# Patient Record
Sex: FEMALE | Race: WHITE | NOT HISPANIC OR LATINO | Employment: STUDENT | URBAN - METROPOLITAN AREA
[De-identification: names, ages, dates, MRNs, and addresses within clinical notes are randomized per-mention and may not be internally consistent; named-entity substitution may affect disease eponyms.]

---

## 2019-05-01 PROBLEM — E66.811 OBESITY, CLASS I, BMI 30-34.9: Status: ACTIVE | Noted: 2019-05-01

## 2023-10-26 PROBLEM — E66.811 OBESITY, CLASS I, BMI 30-34.9: Status: RESOLVED | Noted: 2019-05-01 | Resolved: 2023-10-26

## 2023-11-20 ENCOUNTER — APPOINTMENT (OUTPATIENT)
Dept: LAB | Facility: CLINIC | Age: 28
End: 2023-11-20

## 2023-11-20 ENCOUNTER — OCCMED (OUTPATIENT)
Dept: URGENT CARE | Facility: CLINIC | Age: 28
End: 2023-11-20

## 2023-11-20 DIAGNOSIS — Z02.1 PRE-EMPLOYMENT HEALTH SCREENING EXAMINATION: ICD-10-CM

## 2023-11-20 DIAGNOSIS — Z02.1 PRE-EMPLOYMENT HEALTH SCREENING EXAMINATION: Primary | ICD-10-CM

## 2023-11-20 LAB
RUBV IGG SERPL IA-ACNC: 101.5 IU/ML
RUBV IGG SERPL IA-ACNC: 101.5 IU/ML

## 2023-11-20 PROCEDURE — 86735 MUMPS ANTIBODY: CPT

## 2023-11-20 PROCEDURE — 86480 TB TEST CELL IMMUN MEASURE: CPT

## 2023-11-20 PROCEDURE — 86787 VARICELLA-ZOSTER ANTIBODY: CPT

## 2023-11-20 PROCEDURE — 86765 RUBEOLA ANTIBODY: CPT

## 2023-11-20 PROCEDURE — 36415 COLL VENOUS BLD VENIPUNCTURE: CPT

## 2023-11-20 PROCEDURE — 86762 RUBELLA ANTIBODY: CPT

## 2023-11-21 LAB
GAMMA INTERFERON BACKGROUND BLD IA-ACNC: <0 IU/ML
GAMMA INTERFERON BACKGROUND BLD IA-ACNC: <0 IU/ML
M TB IFN-G BLD-IMP: NEGATIVE
M TB IFN-G BLD-IMP: NEGATIVE
M TB IFN-G CD4+ BCKGRND COR BLD-ACNC: 0 IU/ML
MEV IGG SER QL IA: NORMAL
MEV IGG SER QL IA: NORMAL
MITOGEN IGNF BCKGRD COR BLD-ACNC: 10 IU/ML
MITOGEN IGNF BCKGRD COR BLD-ACNC: 10 IU/ML
MUV IGG SER QL IA: NORMAL
MUV IGG SER QL IA: NORMAL
VZV IGG SER QL IA: NORMAL
VZV IGG SER QL IA: NORMAL

## 2024-02-21 PROBLEM — J30.9 ALLERGIC SINUSITIS: Status: RESOLVED | Noted: 2018-08-01 | Resolved: 2024-02-21

## 2024-03-26 DIAGNOSIS — J45.40 MODERATE PERSISTENT ASTHMA WITHOUT COMPLICATION: ICD-10-CM

## 2024-03-26 RX ORDER — LEVALBUTEROL TARTRATE 45 UG/1
AEROSOL, METERED ORAL
Qty: 15 G | Refills: 3 | Status: SHIPPED | OUTPATIENT
Start: 2024-03-26

## 2024-05-05 ENCOUNTER — OFFICE VISIT (OUTPATIENT)
Dept: URGENT CARE | Facility: CLINIC | Age: 29
End: 2024-05-05
Payer: COMMERCIAL

## 2024-05-05 VITALS
WEIGHT: 293 LBS | SYSTOLIC BLOOD PRESSURE: 140 MMHG | RESPIRATION RATE: 18 BRPM | OXYGEN SATURATION: 98 % | HEART RATE: 71 BPM | BODY MASS INDEX: 48.42 KG/M2 | DIASTOLIC BLOOD PRESSURE: 80 MMHG | TEMPERATURE: 96.8 F

## 2024-05-05 DIAGNOSIS — J20.9 ACUTE BRONCHITIS, UNSPECIFIED ORGANISM: Primary | ICD-10-CM

## 2024-05-05 PROCEDURE — 99213 OFFICE O/P EST LOW 20 MIN: CPT | Performed by: FAMILY MEDICINE

## 2024-05-05 RX ORDER — ALBUTEROL SULFATE 90 UG/1
2 AEROSOL, METERED RESPIRATORY (INHALATION) EVERY 4 HOURS PRN
Qty: 18 G | Refills: 0 | Status: SHIPPED | OUTPATIENT
Start: 2024-05-05

## 2024-05-05 RX ORDER — METHYLPREDNISOLONE 4 MG/1
TABLET ORAL
Qty: 21 TABLET | Refills: 0 | Status: SHIPPED | OUTPATIENT
Start: 2024-05-05

## 2024-05-05 RX ORDER — AZITHROMYCIN 250 MG/1
TABLET, FILM COATED ORAL
Qty: 6 TABLET | Refills: 0 | Status: SHIPPED | OUTPATIENT
Start: 2024-05-05 | End: 2024-05-09

## 2024-05-05 RX ORDER — IPRATROPIUM BROMIDE AND ALBUTEROL SULFATE 2.5; .5 MG/3ML; MG/3ML
3 SOLUTION RESPIRATORY (INHALATION) ONCE
Status: COMPLETED | OUTPATIENT
Start: 2024-05-05 | End: 2024-05-05

## 2024-05-05 RX ADMIN — IPRATROPIUM BROMIDE AND ALBUTEROL SULFATE 3 ML: 2.5; .5 SOLUTION RESPIRATORY (INHALATION) at 12:38

## 2024-05-05 NOTE — PROGRESS NOTES
[facility]        NAME: Laureen Mathis is a 29 y.o. female  : 1995    MRN: [id number]  DATE: May 5, 2024  TIME: 12:49 PM    Assessment and Plan   Acute bronchitis, unspecified organism [J20.9]  1. Acute bronchitis, unspecified organism  ipratropium-albuterol (DUO-NEB) 0.5-2.5 mg/3 mL inhalation solution 3 mL    azithromycin (ZITHROMAX) 250 mg tablet    albuterol (Ventolin HFA) 90 mcg/act inhaler    methylPREDNISolone 4 MG tablet therapy pack            Patient Instructions       Follow up with PCP in 3-5 days.  Proceed to  ER if symptoms worsen.    If tests have been performed at [facility], our office will contact you with results if changes need to be made to the care plan discussed with you at the visit.  [facility]    Chief Complaint     Chief Complaint   Patient presents with    Cough     Pt reports x3 weeks.          History of Present Illness       29-year-old female presenting with 3-week history of cough, congestion and chest tightness.  She also reports feeling increasingly short of breath and is having now persistent wheezing.  She has been using her home nebulizer albuterol with no noted relief.  Denies any fevers or chills.    Cough  Associated symptoms include shortness of breath and wheezing.       Review of Systems   Review of Systems   Constitutional: Negative.    HENT: Negative.     Eyes: Negative.    Respiratory:  Positive for cough, shortness of breath and wheezing.    Cardiovascular: Negative.    Gastrointestinal: Negative.    Genitourinary: Negative.    Skin: Negative.    Allergic/Immunologic: Negative.    Neurological: Negative.    Hematological: Negative.    Psychiatric/Behavioral: Negative.           Current Medications       Current Outpatient Medications:     albuterol (Ventolin HFA) 90 mcg/act inhaler, Inhale 2 puffs every 4 (four) hours as needed for wheezing or shortness of breath, Disp: 18 g, Rfl: 0    azithromycin (ZITHROMAX)  250 mg tablet, Take 2 tablets today then 1 tablet daily x 4 days, Disp: 6 tablet, Rfl: 0    budesonide-formoterol (SYMBICORT) 160-4.5 mcg/act inhaler, Inhale 2 puffs 2 (two) times a day Rinse mouth after use., Disp: 10.2 g, Rfl: 3    cetirizine (ZyrTEC) 10 MG chewable tablet, Chew 10 mg daily, Disp: , Rfl:     metFORMIN (GLUCOPHAGE-XR) 500 mg 24 hr tablet, Take 2 tablets (1,000 mg total) by mouth daily with breakfast, Disp: 180 tablet, Rfl: 1    methylPREDNISolone 4 MG tablet therapy pack, Use as directed on package, Disp: 21 tablet, Rfl: 0    montelukast (SINGULAIR) 10 mg tablet, Take 1 tablet (10 mg total) by mouth daily at bedtime, Disp: 30 tablet, Rfl: 5    albuterol (ACCUNEB) 1.25 MG/3ML nebulizer solution, Take 3 mL (1.25 mg total) by nebulization every 4 (four) hours as needed for wheezing, Disp: 360 mL, Rfl: 0    budesonide (PULMICORT) 0.5 mg/2 mL nebulizer solution, TAKE 4 ML (1 MG TOTAL) BY NEBULIZATION 2 (TWO) TIMES A DAY FOR 7 DAYS RINSE MOUTH AFTER USE., Disp: 720 mL, Rfl: 1    fluticasone (FLONASE) 50 mcg/act nasal spray, SPRAY 1 SPRAY INTO EACH NOSTRIL EVERY DAY, Disp: 16 mL, Rfl: 1    levalbuterol (XOPENEX HFA) 45 mcg/act inhaler, INHALE 1-2 PUFFS EVERY 4 HOURS AS NEEDED FOR WHEEZING OR SHORTNESS OF BREATH., Disp: 15 g, Rfl: 3    Naltrexone-buPROPion HCl ER 8-90 MG TB12, 1 tab daily x 7 days, 1 tab twice daily x 7 days, 2 tabs in AM and 1 tab in PM x 7 days, then 2 tabs twice daily (Patient not taking: Reported on 5/5/2024), Disp: 120 tablet, Rfl: 0  No current facility-administered medications for this visit.    Current Allergies     Allergies as of 05/05/2024 - Reviewed 05/05/2024   Allergen Reaction Noted    Dust mite extract Sneezing 07/30/2019    Other  09/15/2016    Shellfish allergy - food allergy Other (See Comments) 09/15/2016            The following portions of the patient's history were reviewed and updated as appropriate: allergies, current medications, past family history, past medical  history, past social history, past surgical history and problem list.     Past Medical History:   Diagnosis Date    Abnormal Pap smear of cervix     Asthma     HPV (human papilloma virus) anogenital infection 2/14/2020    Polycystic ovary syndrome        Past Surgical History:   Procedure Laterality Date    WISDOM TOOTH EXTRACTION         Family History   Problem Relation Age of Onset    Bipolar disorder Mother     Diabetes Mother     Mental illness Mother         bipolar    Hypertension Father     Thyroid disease Father     Substance Abuse Neg Hx         neg fam hx         Medications have been verified.        Objective   /80   Pulse 71   Temp (!) 96.8 °F (36 °C)   Resp 18   Wt 136 kg (300 lb)   LMP  (Approximate) Comment: Pt reports regularly irregular.  SpO2 98%   BMI 48.42 kg/m²   No LMP recorded (approximate).       Physical Exam     Physical Exam  Vitals and nursing note reviewed.   Constitutional:       Appearance: She is well-developed.   HENT:      Head: Normocephalic.      Nose: Nose normal.   Eyes:      Pupils: Pupils are equal, round, and reactive to light.   Cardiovascular:      Rate and Rhythm: Normal rate and regular rhythm.   Pulmonary:      Effort: Pulmonary effort is normal.      Breath sounds: Decreased air movement present. Decreased breath sounds and wheezing present.   Abdominal:      General: Abdomen is flat.   Musculoskeletal:         General: Normal range of motion.      Cervical back: Normal range of motion.   Skin:     General: Skin is warm and dry.   Neurological:      Mental Status: She is alert and oriented to person, place, and time.

## 2024-05-06 ENCOUNTER — OFFICE VISIT (OUTPATIENT)
Dept: BARIATRICS | Facility: CLINIC | Age: 29
End: 2024-05-06
Payer: COMMERCIAL

## 2024-05-06 VITALS
BODY MASS INDEX: 45.99 KG/M2 | HEART RATE: 111 BPM | DIASTOLIC BLOOD PRESSURE: 78 MMHG | WEIGHT: 293 LBS | SYSTOLIC BLOOD PRESSURE: 140 MMHG | HEIGHT: 67 IN

## 2024-05-06 DIAGNOSIS — E66.01 MORBID OBESITY WITH BMI OF 45.0-49.9, ADULT (HCC): ICD-10-CM

## 2024-05-06 PROCEDURE — 99204 OFFICE O/P NEW MOD 45 MIN: CPT | Performed by: INTERNAL MEDICINE

## 2024-05-06 NOTE — PROGRESS NOTES
Assessment/Plan     Laureen Mathis is 29 y.o. year old female  who comes in for consultation for assistance with weight management.     - Discussed options of HealthyCORE-Intensive Lifestyle Intervention Program, Very Low Calorie Diet-VLCD, and Conservative Program and the role of weight loss medications.  - Patient is interested in pursuing Conservative Program    Morbid obesity with BMI of 45.0-49.9, adult (HCC)  Nutrition:  Reviewed diet recall; it is unclear whether patient is getting adequate calories or if she is slowing her metabolism down with too few calories  -Also encouraged patient to incorporate protein with meals and snacks for metabolism and satiety benefit  -Patient encouraged to meet with the dietitian for a nutrition prescription that she can follow    Physical Activity: Introduced [employer] fitness program patient will look into it; recommended following general guidelines below for aerobic activity and strength training as below; 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  Patient reports she does not have a history of high blood pressure but reports she feels nervous as her heart rate is also elevated at 111  -Discussed antiobesity pharmacotherapy at length  -Discussed low availability of some doses of Zepbound which is subject to change  -Discussed Wegovy and patient would like to initiate that.  Patient made aware that supply challenges may arise in the future due to  shortages and Zepbound which may reflect and Wegovy supplies in the future.  Patient understands that cessation of therapy may happen  -Phentermine may not be a good choice along with Qsymia as her blood pressure is elevated in the 140s with tachycardia  -Patient reports that she was prescribed Contrave with her prior insurance which denied it and she never started it  -Patient reports that she was intolerant of metformin which was prescribed for PCOS with abdominal cramps and diarrhea past 2 weeks and she did not want to continue the medication  -    Laureen was seen today for consult.    Diagnoses and all orders for this visit:    Morbid obesity with BMI of 45.0-49.9, adult ([facility])  -     Ambulatory referral to Weight Management  -     Hemoglobin A1C; Future  -     Insulin, fasting; Future  -     Lipid panel; Future  -     T3, free; Future  -     Semaglutide-Weight Management (WEGOVY) 0.25 MG/0.5ML; Inject 0.5 mL (0.25 mg total) under the skin once a week      Wegovy Instructions:    - Begin Wegovy 0.25 mg subcutaneously once a week. Dose changes may occur after 4 doses if medication is tolerated. You will be assessed prior to each dose change to make sure you are tolerating the medication well.  - Please message me when you have 2 pens left from the prescription so there are no lapses in treatment.  - Visit wegovy.com for further information/injection instructions.   -Please eat small frequent, low fat meals to help reduce nausea. Lemon water and saltine crackers may help with this. Avoid fried foods  - Try to stop eating before you feel full  - Side effects of Wegovy discussed: nausea, vomiting, diarrhea, and constipation. If you experience fever, nausea/vomiting, and pain radiating to your back this may be a sign of pancreatitis. Please go to the emergency room if this occurs.  -- Take precautions to avoid dehydration. Aim for 64-80 oz of fluids/day  - A bowel regimen including OTC stool softeners, fiber  supplements to manage constipation is suggested  - Patient understands the side effects of the medication and proper administration. Patient agrees with the treatment plan and all questions were answered.    The most common side effects of Wegovy include nausea, diarrhea, vomiting, constipation, abdominal (stomach) pain, headache, fatigue, dyspepsia (indigestion), dizziness, abdominal distension, eructation (belching), hypoglycemia (low blood sugar) in patients with type 2 diabetes treated with other drugs,  flatulence (gas buildup), gastroenteritis (an intestinal infection) and gastroesophageal reflux disease (a type of digestive disorder).     Wegovy should not be used in patients with a personal or family history of medullary thyroid carcinoma or in patients with a rare condition called Multiple Endocrine Neoplasia syndrome type 2 (MEN 2).     Wegovy also contains warnings for inflammation of the pancreas (pancreatitis), gallbladder problems (including gallstones), low blood sugar, acute kidney injury, diabetic retinopathy (damage to the eye's retina), increased heart rate and suicidal behavior or thinking .  Patient denies personal or ffamily Hx of pancreatitis, MEN 2 tumors and medullary thyroid cancer. Patient understands these major side effects and agrees to start the medication.    Subcutaneous injection:  Inject subQ into the thigh, abdomen, or upper arm; rotate injection sites.  Administer at any time of day, with or without meals.  Administer separately from insulin (do not mix the products); may inject both medications in the same body region but not adjacent to each other.  The day of the weekly administration can be changed as long as the time between the 2 doses is at least 3 days (72 hours)  Administer a missed dose as soon as possible within 4 days (96 hours) of missed dose; if more than 4 days have passed, skip the missed dose and administer next dose on regularly scheduled day and resume regular  "once weekly dosing schedule       -In addition, please follow general recommendations below.          Return visit:  6-8 weeks         General Lifestyle recommendations:    Nutrition   -Avoid skipping meals. Avoid sugary beverages. At least 64oz of water daily.  Limit processed food, refined sugars and grain. Encourage  healthy choices for meals and snacks   -Focus on protein goals and non starchy fiber rich vegetables for satiety effect and to help support a calorie deficit.   - Emphasize portion control, well balanced macronutrient's (protein, carbohydrate, fat using MyPlate method )and adequate protein with each meal/snacks and distributing calories equally throughout the day along with.   -Advise starting the day with a protein breakfast   Behavioral/Stress   Food log via eriberto or provided paper log (eriberto options include www.[employer].com, sparkpeople.com, loseit.com, calorieking.com, [employer]). Encouraged mindful eating. Be sure to set aside time to eat, eat slowly, and savor your food. Consider meditation apps and/or taking a few minutes of mindfulness every AM. Understand the role of regarding the role of stress hormone cortisol in promoting weight gain and visceral fat accumulation. Weigh daily or atleast 2-3 times/ week  Physical Activity   Increase physical activity by 10 minutes daily. Gradually increase physical activity to a goal of 5 days per week for 30 minutes of MODERATE intensity ( should be able to pass the \"talk test\" but should not be able to sing. Target 150-300 minutes  PLUS 2 days per week of FULL BODY resistance training. Progression will be addressed at follow up visits. Encouraged contemplation regarding establishing a daily physical activity routine  - Resistance training along with increase protein intake is important to maintain and enhance metabolism  Sleep   Encourage sleep hygiene and importance of having adequate sleep duration at least > 6 hours to support response in weight loss " efforts    Handouts provided :  THRIVE program at [facility]  MyPlate and food quality  Food log resources, phone eriberto or paper journal  Antiobesity medications options     - Discussed at length and the role of weight loss medications and medication options   - Explained the importance of making lifestyle changes in addition to starting any anti-obesity medications if the  patient chooses.  -  Initial weight loss goal of 5-10% weight loss for improved health  - Weight loss can improve patient's co-morbid conditions and/or prevent weight-related complications.  - Weight is not at goal and patient has been unable to achieve a meaningful weight loss above 5% using various programs and tools for more than 6 months    Laureen was seen today for consult.    Diagnoses and all orders for this visit:    Morbid obesity with BMI of 45.0-49.9, adult ([facility])  -     Ambulatory referral to Weight Management  -     Hemoglobin A1C; Future  -     Insulin, fasting; Future  -     Lipid panel; Future  -     T3, free; Future  -     Semaglutide-Weight Management (WEGOVY) 0.25 MG/0.5ML; Inject 0.5 mL (0.25 mg total) under the skin once a week                      Total time spent reviewing chart, interviewing patient, examining patient, discussing plan, answering all questions, and documentin min, with >50% face-to-face time spent counseling patient on nonsurgical interventions for the treatment of excess weight. Discussed in detail nonsurgical options including intensive lifestyle intervention program, very low-calorie diet program and conservative program.  Discussed the role of weight loss medications.  Counseled patient on diet behavior and exercise modification for weight loss.            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  Sleep -- STOP- BANG-  2/8 8 hours no trouble switching between nights and days    Occupation- night shift   Psycho social-      Start date: 5/6/2024   Current weight : 293 lbs  Current BMI: 46.61 kg/m2  Obesity Class: Above 40- Obesity Class III  Goal weight: 190  lbs    Food behaviorssnacking/grazing-only while at her shift  Gyneac (Menopausal status/periods/contraception)- PCOS , stopped taking OCP last month  Previous Weight loss medications/Weight loss attempts:   Have not been taking metformin for the last 3 months due to GI upset diarrhea and cramps put on it for 5 years  -5 years ago was put on phentermine lost weight 40 lbs but had palpitations        Weight history       Onset--in her 20s since a diagnosis of PCOS  Fam hx of obesity- mom, overweight  Patient reports and other history-  Referred by PCP  Wt Readings from Last 20 Encounters:   05/06/24 133 kg (293 lb 3.2 oz)   05/05/24 136 kg (300 lb)   10/26/23 131 kg (289 lb)   10/13/23 129 kg (285 lb)   10/13/23 129 kg (285 lb)   02/06/23 113 kg (250 lb)   01/27/23 128 kg (283 lb)   11/18/22 127 kg (280 lb)   11/15/22 126 kg (278 lb)   10/08/21 121 kg (266 lb)   05/27/21 118 kg (260 lb 3.2 oz)   11/10/20 112 kg (247 lb)   11/06/20 111 kg (244 lb)   09/02/20 109 kg (240 lb 9.6 oz)   08/24/20 109 kg (239 lb 6.4 oz)   02/12/20 102 kg (225 lb 6.4 oz)   08/07/19 88.6 kg (195 lb 6.4 oz)   07/31/19 87.7 kg (193 lb 6.4 oz)   07/30/19 88.6 kg (195 lb 6.4 oz)   07/25/19 86.2 kg (190 lb)           Medication considerations/contraindications     -Patient denies personal history of pancreatitis. Patient also denies personal and family history of medullary thyroid cancer and multiple endocrine neoplasia type 2 (MEN 2 tumor). -Patient denies any history of  "kidney stones, seizures, or glaucoma, diabetic retinopathy, gall bladder disease, hyperthyroidism.  -Denies Hx of CAD, PAD, palpitations, arrhythmia.   -Denies uncontrolled anxiety or depression, suicidal behavior or thinking , insomnia or sleep disturbance.         Past medical history/past surgical history       Previous notes and records have been reviewed.    The following portions of the patient's history were reviewed and updated as appropriate: allergies, current medications, past family history, past medical history, past social history, past surgical history, and problem list.    Past Medical History:   Diagnosis Date    Abnormal Pap smear of cervix     Asthma     HPV (human papilloma virus) anogenital infection 2/14/2020    Polycystic ovary syndrome          Past Surgical History:   Procedure Laterality Date    WISDOM TOOTH EXTRACTION               Family History   Problem Relation Age of Onset    Bipolar disorder Mother     Diabetes Mother     Mental illness Mother         bipolar    Hypertension Father     Thyroid disease Father     Substance Abuse Neg Hx         neg fam hx            Objective     /78 (BP Location: Left arm, Patient Position: Sitting, Cuff Size: Large)   Pulse (!) 111   Ht 5' 6.5\" (1.689 m)   Wt 133 kg (293 lb 3.2 oz)   BMI 46.61 kg/m²       Review of Systems   Constitutional:  Negative for fatigue.   HENT:  Negative for sore throat.    Respiratory:  Negative for cough and shortness of breath.    Cardiovascular:  Negative for chest pain, palpitations and leg swelling.   Gastrointestinal:  Negative for abdominal pain, constipation, diarrhea and nausea.   Genitourinary:  Negative for dysuria.   Musculoskeletal:  Negative for arthralgias and back pain.   Skin:  Negative for rash.   Neurological:  Negative for headaches.   Psychiatric/Behavioral:  Negative for dysphoric mood. The patient is not nervous/anxious.        Physical Exam  Vitals and nursing note reviewed. "   Constitutional:       Appearance: Normal appearance.   HENT:      Head: Normocephalic.   Pulmonary:      Effort: Pulmonary effort is normal.   Neurological:      General: No focal deficit present.      Mental Status: She is alert and oriented to person, place, and time.   Psychiatric:         Mood and Affect: Mood normal.         Behavior: Behavior normal.         Thought Content: Thought content normal.         Judgment: Judgment normal.            Medications       Current Outpatient Medications:     albuterol (ACCUNEB) 1.25 MG/3ML nebulizer solution, Take 3 mL (1.25 mg total) by nebulization every 4 (four) hours as needed for wheezing, Disp: 360 mL, Rfl: 0    albuterol (Ventolin HFA) 90 mcg/act inhaler, Inhale 2 puffs every 4 (four) hours as needed for wheezing or shortness of breath, Disp: 18 g, Rfl: 0    azithromycin (ZITHROMAX) 250 mg tablet, Take 2 tablets today then 1 tablet daily x 4 days, Disp: 6 tablet, Rfl: 0    budesonide (PULMICORT) 0.5 mg/2 mL nebulizer solution, TAKE 4 ML (1 MG TOTAL) BY NEBULIZATION 2 (TWO) TIMES A DAY FOR 7 DAYS RINSE MOUTH AFTER USE., Disp: 720 mL, Rfl: 1    budesonide-formoterol (SYMBICORT) 160-4.5 mcg/act inhaler, Inhale 2 puffs 2 (two) times a day Rinse mouth after use., Disp: 10.2 g, Rfl: 3    cetirizine (ZyrTEC) 10 MG chewable tablet, Chew 10 mg daily, Disp: , Rfl:     fluticasone (FLONASE) 50 mcg/act nasal spray, SPRAY 1 SPRAY INTO EACH NOSTRIL EVERY DAY, Disp: 16 mL, Rfl: 1    levalbuterol (XOPENEX HFA) 45 mcg/act inhaler, INHALE 1-2 PUFFS EVERY 4 HOURS AS NEEDED FOR WHEEZING OR SHORTNESS OF BREATH., Disp: 15 g, Rfl: 3    metFORMIN (GLUCOPHAGE-XR) 500 mg 24 hr tablet, Take 2 tablets (1,000 mg total) by mouth daily with breakfast, Disp: 180 tablet, Rfl: 1    methylPREDNISolone 4 MG tablet therapy pack, Use as directed on package, Disp: 21 tablet, Rfl: 0    montelukast (SINGULAIR) 10 mg tablet, Take 1 tablet (10 mg total) by mouth daily at bedtime, Disp: 30 tablet, Rfl: 5    " Naltrexone-buPROPion HCl ER 8-90 MG TB12, 1 tab daily x 7 days, 1 tab twice daily x 7 days, 2 tabs in AM and 1 tab in PM x 7 days, then 2 tabs twice daily, Disp: 120 tablet, Rfl: 0    Semaglutide-Weight Management (WEGOVY) 0.25 MG/0.5ML, Inject 0.5 mL (0.25 mg total) under the skin once a week, Disp: 2 mL, Rfl: 1  No current facility-administered medications for this visit.       Only on allergy medicine and inhalers    Labs and imaging     Recent labs and imaging have been personally reviewed.  Lab Results   Component Value Date    WBC 14.17 (H) 10/13/2023    HGB 11.3 (L) 10/13/2023    HCT 37.8 10/13/2023    MCV 73 (L) 10/13/2023     10/13/2023     Lab Results   Component Value Date     09/19/2016    SODIUM 136 10/13/2023    K 4.1 10/13/2023     10/13/2023    CO2 23 10/13/2023    AGAP 9 10/13/2023    BUN 12 10/13/2023    CREATININE 0.75 10/13/2023    GLUC 83 10/13/2023    GLUF 81 04/13/2018    CALCIUM 9.4 10/13/2023    AST 26 10/13/2023    ALT 15 10/13/2023    ALKPHOS 105 (H) 10/13/2023    PROT 7.1 09/19/2016    TP 8.5 (H) 10/13/2023    BILITOT 0.2 09/19/2016    TBILI 0.48 10/13/2023    EGFR 110 10/13/2023     No results found for: \"HGBA1C\"  Lab Results   Component Value Date    NGU2QQLUIIBD 1.740 01/27/2023     Lab Results   Component Value Date    CHOLESTEROL 125 04/13/2018     Lab Results   Component Value Date    HDL 53 04/13/2018     Lab Results   Component Value Date    TRIG 58 04/13/2018     Lab Results   Component Value Date    LDLCALC 60 04/13/2018                      "

## 2024-05-06 NOTE — ASSESSMENT & PLAN NOTE
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  Patient reports she does not have a history of high blood pressure but reports she feels nervous as her heart rate is also elevated at 111  -Discussed antiobesity pharmacotherapy at length  -Discussed low availability of some doses of Zepbound which is subject to change  -Discussed Wegovy and patient would like to initiate that.  Patient made aware that supply challenges may arise in the future due to shortages and Zepbound which may reflect and Wegovy supplies in the future.  Patient understands that cessation of therapy may happen  -Phentermine may not be a good choice along with Qsymia as her blood pressure is elevated in the 140s with tachycardia  -Patient reports that she was prescribed Contrave with her prior insurance which denied it and she never started it  -Patient reports that she was intolerant of metformin  which was prescribed for PCOS with abdominal cramps and diarrhea past 2 weeks and she did not want to continue the medication  -

## 2024-05-07 ENCOUNTER — TELEPHONE (OUTPATIENT)
Dept: BARIATRICS | Facility: CLINIC | Age: 29
End: 2024-05-07

## 2024-05-07 NOTE — TELEPHONE ENCOUNTER
Wegovy Prior Auth request    Key# [id number]    Fax will be scanned and attached to this encounter.

## 2024-05-09 NOTE — TELEPHONE ENCOUNTER
PA for wegovy    Submitted via    [x]CMM-KEY [id number]  []Surescripts-Case ID #    []Faxed to plan   []Other website    []Phone call Case ID #      Office notes sent, clinical questions answered. Awaiting determination    Turnaround time for your insurance to make a decision on your Prior Authorization can take 7-21 business days.

## 2024-05-15 NOTE — TELEPHONE ENCOUNTER
PA for Wegovy approved Approved     Date(s) approved 5/8/2024-5/8/2025    Case #    Patient advised by          [x] [email or web address] Message  [] Phone call   []LMOM  []L/M to call office as no active Communication consent on file  []Unable to leave detailed message as VM not approved on Communication consent       Pharmacy advised by    [x]Fax  []Phone call    Approval letter scanned into Media Yes

## 2024-06-07 DIAGNOSIS — E66.9 OBESITY, CLASS I, BMI 30-34.9: ICD-10-CM

## 2024-06-07 DIAGNOSIS — E66.9 OBESITY, CLASS I, BMI 30-34.9: Primary | ICD-10-CM

## 2024-06-18 ENCOUNTER — TELEPHONE (OUTPATIENT)
Age: 29
End: 2024-06-18

## 2024-06-18 NOTE — TELEPHONE ENCOUNTER
Patient received mychart notification for 6/20/24 appt that was canceled on 6/17/24.  Verified cancellatin of 6/20/24 appt and confirmed 6/25/24 appt.

## 2024-06-24 DIAGNOSIS — J45.40 MODERATE PERSISTENT ASTHMA WITHOUT COMPLICATION: ICD-10-CM

## 2024-06-24 DIAGNOSIS — U07.1 COVID-19: ICD-10-CM

## 2024-06-25 ENCOUNTER — APPOINTMENT (OUTPATIENT)
Dept: LAB | Facility: CLINIC | Age: 29
End: 2024-06-25
Payer: COMMERCIAL

## 2024-06-25 ENCOUNTER — OFFICE VISIT (OUTPATIENT)
Dept: OBGYN CLINIC | Facility: CLINIC | Age: 29
End: 2024-06-25
Payer: COMMERCIAL

## 2024-06-25 ENCOUNTER — TELEPHONE (OUTPATIENT)
Age: 29
End: 2024-06-25

## 2024-06-25 VITALS
DIASTOLIC BLOOD PRESSURE: 66 MMHG | HEART RATE: 83 BPM | SYSTOLIC BLOOD PRESSURE: 116 MMHG | BODY MASS INDEX: 45.83 KG/M2 | OXYGEN SATURATION: 99 % | WEIGHT: 292 LBS | HEIGHT: 67 IN

## 2024-06-25 DIAGNOSIS — N91.2 AMENORRHEA: ICD-10-CM

## 2024-06-25 DIAGNOSIS — N97.0 ANOVULATION: ICD-10-CM

## 2024-06-25 DIAGNOSIS — N97.0 INFERTILITY DUE TO OLIGO-OVULATION: ICD-10-CM

## 2024-06-25 DIAGNOSIS — E28.2 PCOS (POLYCYSTIC OVARIAN SYNDROME): ICD-10-CM

## 2024-06-25 DIAGNOSIS — E28.2 PCOS (POLYCYSTIC OVARIAN SYNDROME): Primary | ICD-10-CM

## 2024-06-25 LAB
B-HCG SERPL-ACNC: <0.6 MIU/ML (ref 0–5)
EST. AVERAGE GLUCOSE BLD GHB EST-MCNC: 114 MG/DL
ESTRADIOL SERPL-MCNC: 56.6 PG/ML
FSH SERPL-ACNC: 4.5 MIU/ML
HBA1C MFR BLD: 5.6 %
LH SERPL-ACNC: 7.5 MIU/ML
PROLACTIN SERPL-MCNC: 11.49 NG/ML (ref 3.34–26.72)
TSH SERPL DL<=0.05 MIU/L-ACNC: 2.4 UIU/ML (ref 0.45–4.5)

## 2024-06-25 PROCEDURE — 84402 ASSAY OF FREE TESTOSTERONE: CPT

## 2024-06-25 PROCEDURE — 84702 CHORIONIC GONADOTROPIN TEST: CPT

## 2024-06-25 PROCEDURE — 82166 ASSAY ANTI-MULLERIAN HORM: CPT

## 2024-06-25 PROCEDURE — 82627 DEHYDROEPIANDROSTERONE: CPT

## 2024-06-25 PROCEDURE — 99214 OFFICE O/P EST MOD 30 MIN: CPT | Performed by: OBSTETRICS & GYNECOLOGY

## 2024-06-25 PROCEDURE — 82670 ASSAY OF TOTAL ESTRADIOL: CPT

## 2024-06-25 PROCEDURE — 84146 ASSAY OF PROLACTIN: CPT

## 2024-06-25 PROCEDURE — 83001 ASSAY OF GONADOTROPIN (FSH): CPT

## 2024-06-25 PROCEDURE — 36415 COLL VENOUS BLD VENIPUNCTURE: CPT

## 2024-06-25 PROCEDURE — 83002 ASSAY OF GONADOTROPIN (LH): CPT

## 2024-06-25 PROCEDURE — 84443 ASSAY THYROID STIM HORMONE: CPT

## 2024-06-25 PROCEDURE — 83036 HEMOGLOBIN GLYCOSYLATED A1C: CPT

## 2024-06-25 PROCEDURE — 84403 ASSAY OF TOTAL TESTOSTERONE: CPT

## 2024-06-25 RX ORDER — MEDROXYPROGESTERONE ACETATE 10 MG/1
10 TABLET ORAL DAILY
Qty: 10 TABLET | Refills: 2 | Status: SHIPPED | OUTPATIENT
Start: 2024-06-25

## 2024-06-25 RX ORDER — BUDESONIDE AND FORMOTEROL FUMARATE DIHYDRATE 160; 4.5 UG/1; UG/1
2 AEROSOL RESPIRATORY (INHALATION) 2 TIMES DAILY
Qty: 10.2 G | Refills: 3 | Status: SHIPPED | OUTPATIENT
Start: 2024-06-25

## 2024-06-25 RX ORDER — LETROZOLE 2.5 MG/1
2.5 TABLET, FILM COATED ORAL DAILY
Qty: 5 TABLET | Refills: 2 | Status: SHIPPED | OUTPATIENT
Start: 2024-06-25

## 2024-06-25 NOTE — PATIENT INSTRUCTIONS
Take provera    LETROZOLE INDUCTION      Ovulation Induction Instructions    Cycle Day 1 (CD1) is the first day of your cycle.    Start taking your prescribed dose of LETROZOLE  on Cycle Day 3 to Cycle Day 7.    Have intercourse every other day after the LETROZOLE is completed.    Go for blood work (Day 21) PROGESTERONE. Our office will call with results.    Call  with positive pregnancy test or if you get your period

## 2024-06-25 NOTE — PROGRESS NOTES
Problem List Items Addressed This Visit          Endocrine    PCOS (polycystic ovarian syndrome) - Primary    Relevant Orders    DHEA-sulfate    Estradiol    Follicle stimulating hormone    TSH, 3rd generation with Free T4 reflex    Prolactin    Luteinizing hormone    Testosterone, free, total    Hemoglobin A1C    Antimullerian hormone (AMH)    US pelvis complete w transvaginal    hCG, quantitative     Other Visit Diagnoses       Amenorrhea        Relevant Orders    US pelvis complete w transvaginal    hCG, quantitative    Infertility due to oligo-ovulation        Relevant Medications    medroxyPROGESTERone (PROVERA) 10 mg tablet    letrozole (FEMARA) 2.5 mg tablet    Other Relevant Orders    Progesterone    Ambulatory Referral to Obstetrics / Gynecology    Anovulation        Relevant Medications    medroxyPROGESTERone (PROVERA) 10 mg tablet    letrozole (FEMARA) 2.5 mg tablet    Other Relevant Orders    Progesterone          Patient was advised labs and ultrasound to repeat evaluation for PCOS.  Discussed with patient she may be referred to [facility] due to diagnosis of PCOS, and ovulation even if she has not had infertility for 1 year.    I had offered patient to start letrozole for ovulation induction which she accepted.  She was started on progesterone for progesterone withdrawal and advised letrozole from days 3-7.  Day 21 progesterone was advised.  Follow-up in 4 months for annual GYN exam.          Subjective      Laureen Mathis is a 29 y.o. female who presents for evaluation of infertility. Patient and partner have been attempting conception for  14  months.  Marital status:  for 1 or 2 years. Pregnancies with current partner: no.    Menstrual and Endocrine History  LMP Patient's last menstrual period was 02/20/2024 (approximate).   Menarche 14   Shortest interval 3 months   Longest interval 4   months   Duration of flow 5 days   Heavy menses no   Clots no   Intermenstrual bleeding no   Postcoital bleeding  no   Dysmenorrhea no   Amenorrhea PCOS, irregular periods since age 19   Weight change no   Hirsutism no   Balding no   Acne no   Galactorrhea no     Obstetrical History  Never pregnant    Gynecologic History  Last PAP 10/8/21   Previous abdominal or pelvic surgery no   Pelvic pain no   Endometriosis no   Hot flashes no   DOMINIC exposure no   Abnormal Pap yes   Cervix Cryo/cone colposcopy   Sexually transmitted diseases no   Pelvic inflammatory disease no     Infertility and Endocrine Studies  Basal body temperature yes   Endo with biopsy no   Hysterosalpingogram no   Post-coital test no   Laparoscopy no   Hormonal studies no   Semen analysis no   Other studies no   Medications none   Other therapies Not applicable   Insemination not applicable     PCOS on US    Sexual History  Frequency 3 times per weeks   Satisfied yes   Dyspareunia no   Use of lubricant no   Douching no   Number of lifetime sex partners 3     Contraception  None    Family History  Thyroid problems  yes   Heart condition or high blood pressure  no   Blood clot or stroke  no   Diabetes  yes   Cancer  no   Birth defects/inherited diseases  no   Infectious diseases (mumps, TB, rubella)  no   Other medical problems  no     Habits  Cigarettes:     Wife -  no      - no  Alcohol:     Wife -   occasional      -  occasional  Marijuana:    Wife - no     - no    The following portions of the patient's history were reviewed and updated as appropriate: allergies, current medications, past family history, past medical history, past social history, past surgical history, and problem list.    Review of Systems  Pertinent items are noted in HPI.   Male History and Exam  Name:  Aj Mathis      Age: 31          Paternity of Pregnancies:  Number with this partner: 1  Number with other partners: 0  Age of youngest child: not applicable    Urologic History:  Infection no   STD no   Mumps no   Varicocele no   Semen analysis no   Undescended testes  "no   Testicular trauma no   Genital surgery no   Ejaculatory problem no   Impotence no                Objective         Female Exam  /66   Pulse 83   Ht 5' 6.5\" (1.689 m)   Wt 132 kg (292 lb)   LMP 02/20/2024 (Approximate)   SpO2 99%   BMI 46.42 kg/m²   Wt Readings from Last 1 Encounters:   06/25/24 132 kg (292 lb)     BMI: Body mass index is 46.42 kg/m².        Physical Exam  Constitutional:       General: She is not in acute distress.     Appearance: She is well-developed.   Cardiovascular:      Rate and Rhythm: Normal rate.      Pulses: Normal pulses.   Neurological:      Mental Status: She is alert and oriented to person, place, and time.   Skin:     General: Skin is warm and dry.   Psychiatric:         Attention and Perception: Attention normal.         Mood and Affect: Mood normal.         Speech: Speech normal.         Behavior: Behavior normal. Behavior is cooperative.            "

## 2024-06-25 NOTE — TELEPHONE ENCOUNTER
Patient called and knows to start the Provera today, but would like to know if she need to take all 10 or can she stop when she gets a period.      Provider advised:   She can stop when she gets her period. If she doesn't she needs to complete the 10 days and wait for her period to come to start the letrozole.    Discussed with patient, no further questions or concerns at this time.

## 2024-06-26 LAB
DHEA-S SERPL-MCNC: 203 UG/DL (ref 84.8–378)
TESTOST FREE SERPL-MCNC: 3.1 PG/ML (ref 0–4.2)
TESTOST SERPL-MCNC: 58 NG/DL (ref 13–71)

## 2024-06-30 LAB — MIS SERPL-MCNC: 21.9 NG/ML

## 2024-07-22 ENCOUNTER — HOSPITAL ENCOUNTER (OUTPATIENT)
Dept: ULTRASOUND IMAGING | Facility: MEDICAL CENTER | Age: 29
Discharge: HOME | End: 2024-07-22
Payer: COMMERCIAL

## 2024-07-22 DIAGNOSIS — E28.2 PCOS (POLYCYSTIC OVARIAN SYNDROME): ICD-10-CM

## 2024-07-22 DIAGNOSIS — N91.2 AMENORRHEA: ICD-10-CM

## 2024-07-22 PROCEDURE — 76856 US EXAM PELVIC COMPLETE: CPT

## 2024-07-22 PROCEDURE — 76830 TRANSVAGINAL US NON-OB: CPT

## 2024-10-12 DIAGNOSIS — E28.2 PCOS (POLYCYSTIC OVARIAN SYNDROME): ICD-10-CM

## 2024-10-15 RX ORDER — METFORMIN HYDROCHLORIDE 500 MG/1
1000 TABLET, EXTENDED RELEASE ORAL
Qty: 180 TABLET | Refills: 0 | Status: SHIPPED | OUTPATIENT
Start: 2024-10-15

## 2024-11-16 DIAGNOSIS — J45.40 MODERATE PERSISTENT ASTHMA WITHOUT COMPLICATION: ICD-10-CM

## 2024-11-19 RX ORDER — LEVALBUTEROL TARTRATE 45 UG/1
2 AEROSOL, METERED ORAL EVERY 8 HOURS PRN
Qty: 15 G | Refills: 5 | Status: SHIPPED | OUTPATIENT
Start: 2024-11-19

## 2024-11-26 ENCOUNTER — PATIENT MESSAGE (OUTPATIENT)
Dept: OBGYN CLINIC | Facility: CLINIC | Age: 29
End: 2024-11-26

## 2024-11-26 NOTE — PATIENT COMMUNICATION
Patient was called and scheduled for surgical consult on 12/19 with Dr. Woods. Patient's yearly was rescheduled as well to 1/7 with Dr. Woods. Informed patient that we did not receive her US results. Informed her that we will contact her tomorrow if we still did not receive them. Can mychart or call.

## 2024-12-11 DIAGNOSIS — U07.1 COVID-19: ICD-10-CM

## 2024-12-11 DIAGNOSIS — J45.40 MODERATE PERSISTENT ASTHMA WITHOUT COMPLICATION: ICD-10-CM

## 2024-12-12 RX ORDER — BUDESONIDE AND FORMOTEROL FUMARATE DIHYDRATE 160; 4.5 UG/1; UG/1
2 AEROSOL RESPIRATORY (INHALATION) 2 TIMES DAILY
Qty: 10.2 G | Refills: 3 | Status: SHIPPED | OUTPATIENT
Start: 2024-12-12

## 2024-12-12 RX ORDER — BUDESONIDE 0.5 MG/2ML
1 INHALANT ORAL 2 TIMES DAILY
Qty: 720 ML | Refills: 1 | Status: SHIPPED | OUTPATIENT
Start: 2024-12-12 | End: 2024-12-19

## 2024-12-19 ENCOUNTER — OFFICE VISIT (OUTPATIENT)
Dept: URGENT CARE | Facility: CLINIC | Age: 29
End: 2024-12-19
Payer: COMMERCIAL

## 2024-12-19 VITALS
HEART RATE: 102 BPM | TEMPERATURE: 100.4 F | RESPIRATION RATE: 18 BRPM | SYSTOLIC BLOOD PRESSURE: 130 MMHG | DIASTOLIC BLOOD PRESSURE: 82 MMHG | OXYGEN SATURATION: 97 %

## 2024-12-19 DIAGNOSIS — J20.9 ACUTE BRONCHITIS, UNSPECIFIED ORGANISM: Primary | ICD-10-CM

## 2024-12-19 PROCEDURE — 99213 OFFICE O/P EST LOW 20 MIN: CPT

## 2024-12-19 RX ORDER — ALBUTEROL SULFATE 90 UG/1
2 INHALANT RESPIRATORY (INHALATION) EVERY 6 HOURS PRN
Qty: 8.5 G | Refills: 0 | Status: SHIPPED | OUTPATIENT
Start: 2024-12-19

## 2024-12-19 RX ORDER — METHYLPREDNISOLONE 4 MG/1
TABLET ORAL
Qty: 1 EACH | Refills: 0 | Status: SHIPPED | OUTPATIENT
Start: 2024-12-19

## 2024-12-19 RX ORDER — AZITHROMYCIN 250 MG/1
TABLET, FILM COATED ORAL
Qty: 6 TABLET | Refills: 0 | Status: SHIPPED | OUTPATIENT
Start: 2024-12-19 | End: 2024-12-23

## 2024-12-19 NOTE — PROGRESS NOTES
[facility]        NAME: Laureen Mathis is a 29 y.o. female  : 1995    MRN: [id number]  DATE: 2024  TIME: 1:53 PM    Assessment and Plan   Acute bronchitis, unspecified organism [J20.9]  1. Acute bronchitis, unspecified organism  azithromycin (ZITHROMAX) 250 mg tablet    methylPREDNISolone 4 MG tablet therapy pack    albuterol (ProAir HFA) 90 mcg/act inhaler            Patient Instructions       Follow up with PCP in 3-5 days.  Proceed to  ER if symptoms worsen.    If tests have been performed at [facility], our office will contact you with results if changes need to be made to the care plan discussed with you at the visit.  You can review your full results on [facility].    Chief Complaint     Chief Complaint   Patient presents with    Cough     Started a week ago with congested cough, now reports loss of voice, body aches and chills, unsure of fever         History of Present Illness       29-year-old female presents for worsening cold-like symptoms x 7 days.  Patient was at onset she had cough, congestion, chest tightness.  Symptoms improved.  4 days ago symptoms worsened with sore throat, fevers, chills.  Last use Tylenol at 9 AM due to subjective fevers and chills.  Denies known sick contacts.    Cough  Associated symptoms include chills, a fever, rhinorrhea, a sore throat and wheezing. Pertinent negatives include no ear pain.       Review of Systems   Review of Systems   Constitutional:  Positive for chills and fever.   HENT:  Positive for congestion, rhinorrhea and sore throat. Negative for ear pain.    Respiratory:  Positive for cough, chest tightness and wheezing.          Current Medications       Current Outpatient Medications:     albuterol (ProAir HFA) 90 mcg/act inhaler, Inhale 2 puffs every 6 (six) hours as needed for wheezing or shortness of breath (cough), Disp: 8.5 g, Rfl: 0    azithromycin (ZITHROMAX) 250 mg tablet, Take 2 tablets today then 1 tablet daily x 4  days, Disp: 6 tablet, Rfl: 0    methylPREDNISolone 4 MG tablet therapy pack, Use as directed on package, Disp: 1 each, Rfl: 0    albuterol (ACCUNEB) 1.25 MG/3ML nebulizer solution, Take 3 mL (1.25 mg total) by nebulization every 4 (four) hours as needed for wheezing, Disp: 360 mL, Rfl: 0    albuterol (Ventolin HFA) 90 mcg/act inhaler, Inhale 2 puffs every 4 (four) hours as needed for wheezing or shortness of breath, Disp: 18 g, Rfl: 0    budesonide (PULMICORT) 0.5 mg/2 mL nebulizer solution, Take 4 mL (1 mg total) by nebulization 2 (two) times a day for 7 days Rinse mouth after use., Disp: 720 mL, Rfl: 1    budesonide-formoterol (SYMBICORT) 160-4.5 mcg/act inhaler, Inhale 2 puffs 2 (two) times a day Rinse mouth after use., Disp: 10.2 g, Rfl: 3    cetirizine (ZyrTEC) 10 MG chewable tablet, Chew 10 mg daily, Disp: , Rfl:     fluticasone (FLONASE) 50 mcg/act nasal spray, SPRAY 1 SPRAY INTO EACH NOSTRIL EVERY DAY, Disp: 16 mL, Rfl: 1    letrozole (FEMARA) 2.5 mg tablet, Take 1 tablet (2.5 mg total) by mouth daily, Disp: 5 tablet, Rfl: 2    levalbuterol (XOPENEX HFA) 45 mcg/act inhaler, Inhale 2 puffs every 8 (eight) hours as needed for wheezing, Disp: 15 g, Rfl: 5    medroxyPROGESTERone (PROVERA) 10 mg tablet, Take 1 tablet (10 mg total) by mouth daily, Disp: 10 tablet, Rfl: 2    metFORMIN (GLUCOPHAGE-XR) 500 mg 24 hr tablet, Take 2 tablets (1,000 mg total) by mouth daily with breakfast, Disp: 180 tablet, Rfl: 0    methylPREDNISolone 4 MG tablet therapy pack, Use as directed on package, Disp: 21 tablet, Rfl: 0    montelukast (SINGULAIR) 10 mg tablet, Take 1 tablet (10 mg total) by mouth daily at bedtime, Disp: 30 tablet, Rfl: 5    Naltrexone-buPROPion HCl ER 8-90 MG TB12, 1 tab daily x 7 days, 1 tab twice daily x 7 days, 2 tabs in AM and 1 tab in PM x 7 days, then 2 tabs twice daily, Disp: 120 tablet, Rfl: 0    Semaglutide-Weight Management (WEGOVY) 0.5 MG/0.5ML, Inject 0.5 mL (0.5 mg total) under the skin once a week,  Disp: 2 mL, Rfl: 0    Current Allergies     Allergies as of 12/19/2024 - Reviewed 12/19/2024   Allergen Reaction Noted    Dust mite extract Sneezing 07/30/2019    Other  09/15/2016    Shellfish allergy - food allergy Other (See Comments) 09/15/2016            The following portions of the patient's history were reviewed and updated as appropriate: allergies, current medications, past family history, past medical history, past social history, past surgical history and problem list.     Past Medical History:   Diagnosis Date    Abnormal Pap smear of cervix     Asthma     HPV (human papilloma virus) anogenital infection 2/14/2020    Polycystic ovary syndrome        Past Surgical History:   Procedure Laterality Date    WISDOM TOOTH EXTRACTION         Family History   Problem Relation Age of Onset    Bipolar disorder Mother     Diabetes Mother     Mental illness Mother         bipolar    Hypertension Father     Thyroid disease Father     Substance Abuse Neg Hx         neg fam hx         Medications have been verified.        Objective   /82 (BP Location: Right arm, Patient Position: Sitting)   Pulse 102   Temp 100.4 °F (38 °C) (Tympanic)   Resp 18   LMP  (Within Months)   SpO2 97%   No LMP recorded (within months).       Physical Exam     Physical Exam  Vitals and nursing note reviewed.   Constitutional:       General: She is not in acute distress.     Appearance: She is not toxic-appearing.   HENT:      Head: Normocephalic and atraumatic.      Right Ear: Tympanic membrane, ear canal and external ear normal.      Left Ear: Tympanic membrane, ear canal and external ear normal.      Nose: Rhinorrhea present.      Mouth/Throat:      Mouth: Mucous membranes are moist.      Pharynx: No oropharyngeal exudate or posterior oropharyngeal erythema.   Eyes:      Conjunctiva/sclera: Conjunctivae normal.   Cardiovascular:      Rate and Rhythm: Regular rhythm. Tachycardia present.   Pulmonary:      Effort: Pulmonary effort  is normal.      Breath sounds: Wheezing present.   Lymphadenopathy:      Cervical: No cervical adenopathy.   Neurological:      Mental Status: She is alert.   Psychiatric:         Mood and Affect: Mood normal.         Behavior: Behavior normal.

## 2024-12-23 ENCOUNTER — TELEPHONE (OUTPATIENT)
Age: 29
End: 2024-12-23

## 2024-12-23 NOTE — TELEPHONE ENCOUNTER
Left voice message for patient in regards to her question about the appointment. Advised patient that she would need to keep both appointment. Advised patient to call the office if she had any further questions.

## 2024-12-23 NOTE — TELEPHONE ENCOUNTER
Pt carla surg consult for 1/23/25, but has yearly scheduled for 1/7/25. Questioning if she needs both visits. Offered to send message to office to discuss what is needed and if consult can be scheduled earlier.

## 2025-01-13 ENCOUNTER — TELEPHONE (OUTPATIENT)
Age: 30
End: 2025-01-13

## 2025-01-13 NOTE — PROGRESS NOTES
Assessment/Plan:  Problem List Items Addressed This Visit    None  Visit Diagnoses         BMI 45.0-49.9, adult (HCC)    -  Primary    Relevant Orders    Ambulatory Referral to Weight Management      Endometrial polyp          Abnormal uterine bleeding (AUB)               Endometrial polyp    Endometrial polyps are one of the most common etiologies of abnormal bleeding in both premenopausal and postmenopausal women. Discussed that most cases of polyps are benign.  Most cases of endometrial polyps present as abnormal uterine bleeding, incidental finding on ultrasound, hysteroscopy, endometrial cells on Pap, or prolapsed polyp on examination.    Symptomatic polyps should be removed, regardless of menopausal status.    Polypectomy is also recommended if the polyp is larger greater than 1.5 cm, woman is postmenopausal, if there are multiple polyps, if the polyp is large enough that it is prolapsing or if there is infertility.    Some smaller polyps can regress was multiple polyps in larger polyps are less likely to request and most likely cause symptoms.    Polypectomy under hysteroscopic guidance is the preferred treatment of choice for most endometrial polyps.  Polypectomy in general results and improvement of symptoms.    Complications of hysteroscopy and polypectomy include fluid overload, uterine perforation, intraoperative hemorrhage, endometritis, bladder or bowel injury.      Patient was referred to bariatric surgery/weight management for discussion regarding surgical weight loss.  She is interested in hearing about this more  Discussed with patient increased risk with pregnancy with elevated BMI and better outcomes with normal BMI as well as increased fertility rates and ovulatory rates when weight loss is achieved.      Subjective   Patient ID: Laureen Mathis is a 29 y.o. female.    Patient is here for a problem visit.    Chief Complaint   Patient presents with    Consult     Surgical discussion       Patient  with multiple endometrial polyps based off ultrasound done at [place].  Patient is also suffering from infertility.  Patient was told she needs polyps to be removed prior to intrauterine insemination but due to BMI over 40 cannot be performed by their facility.    Menstrual History:  OB History          0    Para   0    Term   0       0    AB   0    Living   0         SAB   0    IAB   0    Ectopic   0    Multiple   0    Live Births   0                  No LMP recorded.         Past Medical History:   Diagnosis Date    Abnormal Pap smear of cervix     Asthma     HPV (human papilloma virus) anogenital infection 2020    Polycystic ovary syndrome        Past Surgical History:   Procedure Laterality Date    WISDOM TOOTH EXTRACTION         Social History     Tobacco Use    Smoking status: Never    Smokeless tobacco: Never   Vaping Use    Vaping status: Never Used   Substance Use Topics    Alcohol use: Yes     Comment: occassionally    Drug use: No        Allergies   Allergen Reactions    Dust Mite Extract Sneezing     Other reaction(s): Sneezing    Other      Dust and trees and pollens    Shellfish Allergy - Food Allergy Other (See Comments)     N/V,   itching         Current Outpatient Medications:     albuterol (ACCUNEB) 1.25 MG/3ML nebulizer solution, Take 3 mL (1.25 mg total) by nebulization every 4 (four) hours as needed for wheezing, Disp: 360 mL, Rfl: 0    albuterol (ProAir HFA) 90 mcg/act inhaler, Inhale 2 puffs every 6 (six) hours as needed for wheezing or shortness of breath (cough), Disp: 8.5 g, Rfl: 0    albuterol (Ventolin HFA) 90 mcg/act inhaler, Inhale 2 puffs every 4 (four) hours as needed for wheezing or shortness of breath, Disp: 18 g, Rfl: 0    budesonide-formoterol (SYMBICORT) 160-4.5 mcg/act inhaler, Inhale 2 puffs 2 (two) times a day Rinse mouth after use., Disp: 10.2 g, Rfl: 3    cetirizine (ZyrTEC) 10 MG chewable tablet, Chew 10 mg daily, Disp: , Rfl:     fluticasone  (FLONASE) 50 mcg/act nasal spray, SPRAY 1 SPRAY INTO EACH NOSTRIL EVERY DAY, Disp: 16 mL, Rfl: 1    metFORMIN (GLUCOPHAGE-XR) 500 mg 24 hr tablet, Take 2 tablets (1,000 mg total) by mouth daily with breakfast, Disp: 180 tablet, Rfl: 0    budesonide (PULMICORT) 0.5 mg/2 mL nebulizer solution, Take 4 mL (1 mg total) by nebulization 2 (two) times a day for 7 days Rinse mouth after use., Disp: 720 mL, Rfl: 1    letrozole (FEMARA) 2.5 mg tablet, Take 1 tablet (2.5 mg total) by mouth daily (Patient not taking: Reported on 1/15/2025), Disp: 5 tablet, Rfl: 2    levalbuterol (XOPENEX HFA) 45 mcg/act inhaler, Inhale 2 puffs every 8 (eight) hours as needed for wheezing (Patient not taking: Reported on 1/15/2025), Disp: 15 g, Rfl: 5    medroxyPROGESTERone (PROVERA) 10 mg tablet, Take 1 tablet (10 mg total) by mouth daily (Patient not taking: Reported on 1/15/2025), Disp: 10 tablet, Rfl: 2    methylPREDNISolone 4 MG tablet therapy pack, Use as directed on package (Patient not taking: Reported on 1/15/2025), Disp: 21 tablet, Rfl: 0    methylPREDNISolone 4 MG tablet therapy pack, Use as directed on package (Patient not taking: Reported on 1/15/2025), Disp: 1 each, Rfl: 0    montelukast (SINGULAIR) 10 mg tablet, Take 1 tablet (10 mg total) by mouth daily at bedtime (Patient not taking: Reported on 1/15/2025), Disp: 30 tablet, Rfl: 5    Naltrexone-buPROPion HCl ER 8-90 MG TB12, 1 tab daily x 7 days, 1 tab twice daily x 7 days, 2 tabs in AM and 1 tab in PM x 7 days, then 2 tabs twice daily (Patient not taking: Reported on 1/15/2025), Disp: 120 tablet, Rfl: 0    Semaglutide-Weight Management (WEGOVY) 0.5 MG/0.5ML, Inject 0.5 mL (0.5 mg total) under the skin once a week (Patient not taking: Reported on 1/15/2025), Disp: 2 mL, Rfl: 0      Pertinent laboratory testing, imaging studies and prior external records reviewed    Review of Systems   Constitutional: Negative.    HENT: Negative.     Eyes: Negative.    Respiratory: Negative.    "  Cardiovascular: Negative.    Gastrointestinal: Negative.    Endocrine: Negative.    Genitourinary:         As noted in HPI   Musculoskeletal: Negative.    Skin: Negative.    Allergic/Immunologic: Negative.    Neurological: Negative.    Hematological: Negative.    Psychiatric/Behavioral: Negative.           /84 (BP Location: Right arm, Patient Position: Sitting, Cuff Size: Adult)   Ht 5' 6.5\" (1.689 m)   Wt (!) 137 kg (302 lb)   BMI 48.01 kg/m²       Physical Exam  Constitutional:       Appearance: She is well-developed.   Genitourinary:      No vaginal discharge or bleeding.        Right Adnexa: not tender and no mass present.     Left Adnexa: not tender and no mass present.     No cervical motion tenderness.      Uterus is not enlarged or tender.   Neck:      Thyroid: No thyromegaly.   Cardiovascular:      Rate and Rhythm: Normal rate and regular rhythm.      Heart sounds: Normal heart sounds.   Pulmonary:      Effort: Pulmonary effort is normal.      Breath sounds: Normal breath sounds.   Abdominal:      General: There is no distension.      Palpations: Abdomen is soft.      Tenderness: There is no abdominal tenderness.   Musculoskeletal:         General: No tenderness.   Neurological:      Mental Status: She is alert and oriented to person, place, and time.   Skin:     General: Skin is warm and dry.         Study Result    Narrative & Impression   PELVIC ULTRASOUND, COMPLETE     INDICATION: The patient is 29 years old. E28.2: Polycystic ovarian syndrome  N91.2: Amenorrhea, unspecified.     COMPARISON: Compared with 4/13/2018     TECHNIQUE: Transabdominal pelvic ultrasound was performed in sagittal and transverse planes with a curvilinear transducer. Additional transvaginal imaging was performed to better evaluate the endometrium and ovaries. Imaging included volumetric sweeps as   well as traditional still imaging technique.     FINDINGS:     UTERUS:  The uterus is anteverted in position, measuring 7.2 " x 3.1 x 5.6 cm.  The uterus has a normal contour and echotexture.  The cervix appears within normal limits.     ENDOMETRIUM:  The endometrial echo complex has an AP caliber of 2.0 mm.  Appearance within normal limits.     OVARIES/ADNEXA:  Right ovary: 4.5 x 3.4 x 3.1 cm. 25.0 mL.  Ovarian Doppler flow is within normal limits.  Multiple peripheral follicles. Large anechoic cyst measuring 4.1 x 4.2 x 3.8 cm.     Left ovary: 3.1 x 3.3 x 3.8 cm. 20.4 mL.  Ovarian Doppler flow is within normal limits.  Multiple peripheral follicles. Large anechoic cyst measuring 3.6 x 3.3 x 3.0 cm compared to a previous focus measuring 1.9 x 1.9 x 1.8 cm.     OTHER:  No free fluid or loculated fluid collections.     IMPRESSION:     Prominent ovaries with bilateral multiple follicles. Large anechoic cyst seen.  Findings consistent with the patient's known history of PCOS.     Bilateral anechoic simple appearing cysts largest on the right measuring 4.1 x 4.2 x 3.8 cm and on the left measuring 3.6 x 3.3 x 3.0 cm O-RADS 2 = Almost certainly benign.  No further followup or workup needed.                    Workstation performed: [id number]            Future Appointments   Date Time Provider Department Center   2/7/2025  9:30 AM Bam Redmond MD [facility]   2/11/2025  9:00 AM Trina Woods MD Complete  [facility]

## 2025-01-13 NOTE — TELEPHONE ENCOUNTER
Patient has an appointment on 1/15 for a consult. Patient called to make sure that Andrew Flores sent over the results from her US. We did received them but there was no images. Patient wants to know if that will be enough or do we need the images? Please follow up with the patient if we need those images before appointment.

## 2025-01-15 ENCOUNTER — TELEPHONE (OUTPATIENT)
Age: 30
End: 2025-01-15

## 2025-01-15 ENCOUNTER — OFFICE VISIT (OUTPATIENT)
Dept: OBGYN CLINIC | Facility: CLINIC | Age: 30
End: 2025-01-15
Payer: COMMERCIAL

## 2025-01-15 VITALS
DIASTOLIC BLOOD PRESSURE: 84 MMHG | BODY MASS INDEX: 45.99 KG/M2 | WEIGHT: 293 LBS | HEIGHT: 67 IN | SYSTOLIC BLOOD PRESSURE: 136 MMHG

## 2025-01-15 DIAGNOSIS — N93.9 ABNORMAL UTERINE BLEEDING (AUB): ICD-10-CM

## 2025-01-15 DIAGNOSIS — N84.0 ENDOMETRIAL POLYP: Primary | ICD-10-CM

## 2025-01-15 PROCEDURE — 99214 OFFICE O/P EST MOD 30 MIN: CPT | Performed by: OBSTETRICS & GYNECOLOGY

## 2025-01-15 RX ORDER — SODIUM CHLORIDE, SODIUM LACTATE, POTASSIUM CHLORIDE, CALCIUM CHLORIDE 600; 310; 30; 20 MG/100ML; MG/100ML; MG/100ML; MG/100ML
125 INJECTION, SOLUTION INTRAVENOUS CONTINUOUS
OUTPATIENT
Start: 2025-01-15

## 2025-01-15 NOTE — TELEPHONE ENCOUNTER
Pt would like to schedule a surgical consult so I transferred her to [place] in the office to help her

## 2025-01-15 NOTE — H&P
Assessment/Plan:  Problem List Items Addressed This Visit    None  Visit Diagnoses         BMI 45.0-49.9, adult (HCC)    -  Primary    Relevant Orders    Ambulatory Referral to Weight Management      Endometrial polyp          Abnormal uterine bleeding (AUB)               Endometrial polyp    Endometrial polyps are one of the most common etiologies of abnormal bleeding in both premenopausal and postmenopausal women. Discussed that most cases of polyps are benign.  Most cases of endometrial polyps present as abnormal uterine bleeding, incidental finding on ultrasound, hysteroscopy, endometrial cells on Pap, or prolapsed polyp on examination.    Symptomatic polyps should be removed, regardless of menopausal status.    Polypectomy is also recommended if the polyp is larger greater than 1.5 cm, woman is postmenopausal, if there are multiple polyps, if the polyp is large enough that it is prolapsing or if there is infertility.    Some smaller polyps can regress was multiple polyps in larger polyps are less likely to request and most likely cause symptoms.    Polypectomy under hysteroscopic guidance is the preferred treatment of choice for most endometrial polyps.  Polypectomy in general results and improvement of symptoms.    Complications of hysteroscopy and polypectomy include fluid overload, uterine perforation, intraoperative hemorrhage, endometritis, bladder or bowel injury.      Patient was referred to bariatric surgery/weight management for discussion regarding surgical weight loss.  She is interested in hearing about this more  Discussed with patient increased risk with pregnancy with elevated BMI and better outcomes with normal BMI as well as increased fertility rates and ovulatory rates when weight loss is achieved.      Subjective   Patient ID: Laureen Mathis is a 29 y.o. female.    Patient is here for a problem visit.    Chief Complaint   Patient presents with    Consult     Surgical discussion       Patient  with multiple endometrial polyps based off ultrasound done at [place].  Patient is also suffering from infertility.  Patient was told she needs polyps to be removed prior to intrauterine insemination but due to BMI over 40 cannot be performed by their facility.    Menstrual History:  OB History          0    Para   0    Term   0       0    AB   0    Living   0         SAB   0    IAB   0    Ectopic   0    Multiple   0    Live Births   0                  No LMP recorded.         Past Medical History:   Diagnosis Date    Abnormal Pap smear of cervix     Asthma     HPV (human papilloma virus) anogenital infection 2020    Polycystic ovary syndrome        Past Surgical History:   Procedure Laterality Date    WISDOM TOOTH EXTRACTION         Social History     Tobacco Use    Smoking status: Never    Smokeless tobacco: Never   Vaping Use    Vaping status: Never Used   Substance Use Topics    Alcohol use: Yes     Comment: occassionally    Drug use: No        Allergies   Allergen Reactions    Dust Mite Extract Sneezing     Other reaction(s): Sneezing    Other      Dust and trees and pollens    Shellfish Allergy - Food Allergy Other (See Comments)     N/V,   itching         Current Outpatient Medications:     albuterol (ACCUNEB) 1.25 MG/3ML nebulizer solution, Take 3 mL (1.25 mg total) by nebulization every 4 (four) hours as needed for wheezing, Disp: 360 mL, Rfl: 0    albuterol (ProAir HFA) 90 mcg/act inhaler, Inhale 2 puffs every 6 (six) hours as needed for wheezing or shortness of breath (cough), Disp: 8.5 g, Rfl: 0    albuterol (Ventolin HFA) 90 mcg/act inhaler, Inhale 2 puffs every 4 (four) hours as needed for wheezing or shortness of breath, Disp: 18 g, Rfl: 0    budesonide-formoterol (SYMBICORT) 160-4.5 mcg/act inhaler, Inhale 2 puffs 2 (two) times a day Rinse mouth after use., Disp: 10.2 g, Rfl: 3    cetirizine (ZyrTEC) 10 MG chewable tablet, Chew 10 mg daily, Disp: , Rfl:     fluticasone  (FLONASE) 50 mcg/act nasal spray, SPRAY 1 SPRAY INTO EACH NOSTRIL EVERY DAY, Disp: 16 mL, Rfl: 1    metFORMIN (GLUCOPHAGE-XR) 500 mg 24 hr tablet, Take 2 tablets (1,000 mg total) by mouth daily with breakfast, Disp: 180 tablet, Rfl: 0    budesonide (PULMICORT) 0.5 mg/2 mL nebulizer solution, Take 4 mL (1 mg total) by nebulization 2 (two) times a day for 7 days Rinse mouth after use., Disp: 720 mL, Rfl: 1    letrozole (FEMARA) 2.5 mg tablet, Take 1 tablet (2.5 mg total) by mouth daily (Patient not taking: Reported on 1/15/2025), Disp: 5 tablet, Rfl: 2    levalbuterol (XOPENEX HFA) 45 mcg/act inhaler, Inhale 2 puffs every 8 (eight) hours as needed for wheezing (Patient not taking: Reported on 1/15/2025), Disp: 15 g, Rfl: 5    medroxyPROGESTERone (PROVERA) 10 mg tablet, Take 1 tablet (10 mg total) by mouth daily (Patient not taking: Reported on 1/15/2025), Disp: 10 tablet, Rfl: 2    methylPREDNISolone 4 MG tablet therapy pack, Use as directed on package (Patient not taking: Reported on 1/15/2025), Disp: 21 tablet, Rfl: 0    methylPREDNISolone 4 MG tablet therapy pack, Use as directed on package (Patient not taking: Reported on 1/15/2025), Disp: 1 each, Rfl: 0    montelukast (SINGULAIR) 10 mg tablet, Take 1 tablet (10 mg total) by mouth daily at bedtime (Patient not taking: Reported on 1/15/2025), Disp: 30 tablet, Rfl: 5    Naltrexone-buPROPion HCl ER 8-90 MG TB12, 1 tab daily x 7 days, 1 tab twice daily x 7 days, 2 tabs in AM and 1 tab in PM x 7 days, then 2 tabs twice daily (Patient not taking: Reported on 1/15/2025), Disp: 120 tablet, Rfl: 0    Semaglutide-Weight Management (WEGOVY) 0.5 MG/0.5ML, Inject 0.5 mL (0.5 mg total) under the skin once a week (Patient not taking: Reported on 1/15/2025), Disp: 2 mL, Rfl: 0      Pertinent laboratory testing, imaging studies and prior external records reviewed    Review of Systems   Constitutional: Negative.    HENT: Negative.     Eyes: Negative.    Respiratory: Negative.    "  Cardiovascular: Negative.    Gastrointestinal: Negative.    Endocrine: Negative.    Genitourinary:         As noted in HPI   Musculoskeletal: Negative.    Skin: Negative.    Allergic/Immunologic: Negative.    Neurological: Negative.    Hematological: Negative.    Psychiatric/Behavioral: Negative.           /84 (BP Location: Right arm, Patient Position: Sitting, Cuff Size: Adult)   Ht 5' 6.5\" (1.689 m)   Wt (!) 137 kg (302 lb)   BMI 48.01 kg/m²       Physical Exam  Constitutional:       Appearance: She is well-developed.   Genitourinary:      No vaginal discharge or bleeding.        Right Adnexa: not tender and no mass present.     Left Adnexa: not tender and no mass present.     No cervical motion tenderness.      Uterus is not enlarged or tender.   Neck:      Thyroid: No thyromegaly.   Cardiovascular:      Rate and Rhythm: Normal rate and regular rhythm.      Heart sounds: Normal heart sounds.   Pulmonary:      Effort: Pulmonary effort is normal.      Breath sounds: Normal breath sounds.   Abdominal:      General: There is no distension.      Palpations: Abdomen is soft.      Tenderness: There is no abdominal tenderness.   Musculoskeletal:         General: No tenderness.   Neurological:      Mental Status: She is alert and oriented to person, place, and time.   Skin:     General: Skin is warm and dry.         Study Result    Narrative & Impression   PELVIC ULTRASOUND, COMPLETE     INDICATION: The patient is 29 years old. E28.2: Polycystic ovarian syndrome  N91.2: Amenorrhea, unspecified.     COMPARISON: Compared with 4/13/2018     TECHNIQUE: Transabdominal pelvic ultrasound was performed in sagittal and transverse planes with a curvilinear transducer. Additional transvaginal imaging was performed to better evaluate the endometrium and ovaries. Imaging included volumetric sweeps as   well as traditional still imaging technique.     FINDINGS:     UTERUS:  The uterus is anteverted in position, measuring 7.2 " x 3.1 x 5.6 cm.  The uterus has a normal contour and echotexture.  The cervix appears within normal limits.     ENDOMETRIUM:  The endometrial echo complex has an AP caliber of 2.0 mm.  Appearance within normal limits.     OVARIES/ADNEXA:  Right ovary: 4.5 x 3.4 x 3.1 cm. 25.0 mL.  Ovarian Doppler flow is within normal limits.  Multiple peripheral follicles. Large anechoic cyst measuring 4.1 x 4.2 x 3.8 cm.     Left ovary: 3.1 x 3.3 x 3.8 cm. 20.4 mL.  Ovarian Doppler flow is within normal limits.  Multiple peripheral follicles. Large anechoic cyst measuring 3.6 x 3.3 x 3.0 cm compared to a previous focus measuring 1.9 x 1.9 x 1.8 cm.     OTHER:  No free fluid or loculated fluid collections.     IMPRESSION:     Prominent ovaries with bilateral multiple follicles. Large anechoic cyst seen.  Findings consistent with the patient's known history of PCOS.     Bilateral anechoic simple appearing cysts largest on the right measuring 4.1 x 4.2 x 3.8 cm and on the left measuring 3.6 x 3.3 x 3.0 cm O-RADS 2 = Almost certainly benign.  No further followup or workup needed.                    Workstation performed: [id number]            Future Appointments   Date Time Provider Department Center   2025  9:30 AM Bam Redmond MD [facility]   2025  9:00 AM Trina Woods MD [facility] OB GYN Department  Surgery Scheduling Sheet    Patient Name: Laureen Mathis  : 1995    Provider: Trina Woods MD     Needed: no; Language: N/A    Procedure: exam under anesthesia, dilation and curettage , hysteroscopy, and polypectomy    Diagnosis: AUB, endometrial polyp    Special Needs or Equipment: Symphion    Anesthesia: General anesthesia    Length of stay: outpatient  Does patient have comorbid conditions that will require close perioperative monitoring prior to safe discharge: no    The patient has comorbid conditions that will require close perioperative monitoring  prior to safe discharge, including N/A.   This may require acute care beyond the usual and routine recovery period. As such, inpatient admission post-operatively is expected and appropriate, and anticipated hospital length of stay will be >2 midnights.    Pre-Admission Testing Needed: no   Labs that should be ordered: cbc, type and screen, and BMP    Order PAT that is recommended in prep for procedure?: Yes    Medical Clearance Needed: no; Provider: N/A    MA Form Signed (tubals/hysterectomy): Not Indicated    Surgical Drink Given: no     How many days out of work: 1 day(s)     How many days no drivin day(s)       Is pre op appt needed?  no  Interval for post op appt: 2 week(s)       For Surgical Scheduler:     Surgery Scheduled On:  [place]: [facility]    Pre-op Appt:   Post op Appt:  Consult/Medical clearance appt:

## 2025-01-15 NOTE — PROGRESS NOTES
[facility] OB GYN Department  Surgery Scheduling Sheet    Patient Name: Laureen Mathis  : 1995    Provider: Trina Woods MD     Needed: no; Language: N/A    Procedure: exam under anesthesia, dilation and curettage , hysteroscopy, and polypectomy    Diagnosis: AUB, endometrial polyp    Special Needs or Equipment: Symphion    Anesthesia: General anesthesia    Length of stay: outpatient  Does patient have comorbid conditions that will require close perioperative monitoring prior to safe discharge: no    The patient has comorbid conditions that will require close perioperative monitoring prior to safe discharge, including N/A.   This may require acute care beyond the usual and routine recovery period. As such, inpatient admission post-operatively is expected and appropriate, and anticipated hospital length of stay will be >2 midnights.    Pre-Admission Testing Needed: no   Labs that should be ordered: cbc, type and screen, and BMP    Order PAT that is recommended in prep for procedure?: Yes    Medical Clearance Needed: no; Provider: N/A    MA Form Signed (tubals/hysterectomy): Not Indicated    Surgical Drink Given: no     How many days out of work: 1 day(s)     How many days no drivin day(s)       Is pre op appt needed?  no  Interval for post op appt: 2 week(s)       For Surgical Scheduler:     Surgery Scheduled On:  [place]: [facility]    Pre-op Appt:   Post op Appt:  Consult/Medical clearance appt:

## 2025-01-16 ENCOUNTER — TELEPHONE (OUTPATIENT)
Dept: OBGYN CLINIC | Facility: CLINIC | Age: 30
End: 2025-01-16

## 2025-01-16 NOTE — TELEPHONE ENCOUNTER
[facility] OB GYN Department  Surgery Scheduling Sheet     Patient Name: Laureen Mathis  : 1995     Provider: Trina Woods MD      Needed: no; Language: N/A     Procedure: exam under anesthesia, dilation and curettage , hysteroscopy, and polypectomy     Diagnosis: AUB, endometrial polyp     Special Needs or Equipment: Symphion     Anesthesia: General anesthesia     Length of stay: outpatient  Does patient have comorbid conditions that will require close perioperative monitoring prior to safe discharge: no     The patient has comorbid conditions that will require close perioperative monitoring prior to safe discharge, including N/A.   This may require acute care beyond the usual and routine recovery period. As such, inpatient admission post-operatively is expected and appropriate, and anticipated hospital length of stay will be >2 midnights.     Pre-Admission Testing Needed: no              Labs that should be ordered: cbc, type and screen, and BMP     Order PAT that is recommended in prep for procedure?: Yes     Medical Clearance Needed: no; Provider: N/A     MA Form Signed (tubals/hysterectomy): Not Indicated     Surgical Drink Given: no      How many days out of work: 1 day(s)     How many days no drivin day(s)        Is pre op appt needed?  no  Interval for post op appt: 2 week(s)         For Surgical Scheduler:      Surgery Scheduled On:  [place]: [facility]     Pre-op Appt:   Post op Appt:  Consult/Medical clearance appt:              Note

## 2025-01-21 NOTE — TELEPHONE ENCOUNTER
Talked to patient she needs to reschedule her 1/27/2025 surgery date due to work schedule. Patient is now scheduled for 3/18/2025 at the [place] OR with Dr. Woods. Patient aware of lab work needed prior to her surgical procedure.

## 2025-02-14 ENCOUNTER — TELEPHONE (OUTPATIENT)
Dept: BARIATRICS | Facility: CLINIC | Age: 30
End: 2025-02-14

## 2025-04-03 ENCOUNTER — TRANSCRIBE ORDERS (OUTPATIENT)
Dept: LAB | Facility: CLINIC | Age: 30
End: 2025-04-03

## 2025-04-03 ENCOUNTER — OFFICE VISIT (OUTPATIENT)
Dept: LAB | Facility: CLINIC | Age: 30
End: 2025-04-03
Payer: COMMERCIAL

## 2025-04-03 ENCOUNTER — ANNUAL EXAM (OUTPATIENT)
Dept: OBGYN CLINIC | Facility: CLINIC | Age: 30
End: 2025-04-03
Payer: COMMERCIAL

## 2025-04-03 ENCOUNTER — APPOINTMENT (OUTPATIENT)
Dept: LAB | Facility: CLINIC | Age: 30
End: 2025-04-03
Payer: COMMERCIAL

## 2025-04-03 VITALS
BODY MASS INDEX: 45.99 KG/M2 | WEIGHT: 293 LBS | OXYGEN SATURATION: 100 % | HEIGHT: 67 IN | HEART RATE: 86 BPM | DIASTOLIC BLOOD PRESSURE: 72 MMHG | SYSTOLIC BLOOD PRESSURE: 118 MMHG

## 2025-04-03 DIAGNOSIS — Z12.4 CERVICAL CANCER SCREENING: ICD-10-CM

## 2025-04-03 DIAGNOSIS — N84.0 ENDOMETRIAL POLYP: ICD-10-CM

## 2025-04-03 DIAGNOSIS — Z01.419 ENCOUNTER FOR GYNECOLOGICAL EXAMINATION (GENERAL) (ROUTINE) WITHOUT ABNORMAL FINDINGS: Primary | ICD-10-CM

## 2025-04-03 DIAGNOSIS — N93.9 ABNORMAL UTERINE BLEEDING: ICD-10-CM

## 2025-04-03 DIAGNOSIS — N84.0 ENDOMETRIAL POLYP: Primary | ICD-10-CM

## 2025-04-03 DIAGNOSIS — N93.9 ABNORMAL UTERINE BLEEDING (AUB): ICD-10-CM

## 2025-04-03 DIAGNOSIS — E28.2 PCOS (POLYCYSTIC OVARIAN SYNDROME): ICD-10-CM

## 2025-04-03 LAB
ABO GROUP BLD: NORMAL
ANION GAP SERPL CALCULATED.3IONS-SCNC: 9 MMOL/L (ref 4–13)
ATRIAL RATE: 59 BPM
ATRIAL RATE: 59 BPM
BLD GP AB SCN SERPL QL: NEGATIVE
BUN SERPL-MCNC: 10 MG/DL (ref 5–25)
CALCIUM SERPL-MCNC: 9.2 MG/DL (ref 8.4–10.2)
CHLORIDE SERPL-SCNC: 105 MMOL/L (ref 96–108)
CO2 SERPL-SCNC: 24 MMOL/L (ref 21–32)
CREAT SERPL-MCNC: 0.72 MG/DL (ref 0.6–1.3)
ERYTHROCYTE [DISTWIDTH] IN BLOOD BY AUTOMATED COUNT: 15.5 % (ref 11.6–15.1)
GFR SERPL CREATININE-BSD FRML MDRD: 114 ML/MIN/1.73SQ M
GLUCOSE P FAST SERPL-MCNC: 87 MG/DL (ref 65–99)
HCT VFR BLD AUTO: 41.4 % (ref 34.8–46.1)
HGB BLD-MCNC: 13.1 G/DL (ref 11.5–15.4)
MCH RBC QN AUTO: 26.6 PG (ref 26.8–34.3)
MCHC RBC AUTO-ENTMCNC: 31.6 G/DL (ref 31.4–37.4)
MCV RBC AUTO: 84 FL (ref 82–98)
P AXIS: 40 DEGREES
P AXIS: 40 DEGREES
PLATELET # BLD AUTO: 310 THOUSANDS/UL (ref 149–390)
PMV BLD AUTO: 11.3 FL (ref 8.9–12.7)
POTASSIUM SERPL-SCNC: 4 MMOL/L (ref 3.5–5.3)
PR INTERVAL: 98 MS
PR INTERVAL: 98 MS
QRS AXIS: 54 DEGREES
QRS AXIS: 54 DEGREES
QRSD INTERVAL: 80 MS
QRSD INTERVAL: 80 MS
QT INTERVAL: 422 MS
QT INTERVAL: 422 MS
QTC INTERVAL: 418 MS
QTC INTERVAL: 418 MS
RBC # BLD AUTO: 4.92 MILLION/UL (ref 3.81–5.12)
RH BLD: POSITIVE
SODIUM SERPL-SCNC: 138 MMOL/L (ref 135–147)
SPECIMEN EXPIRATION DATE: NORMAL
T WAVE AXIS: 41 DEGREES
T WAVE AXIS: 41 DEGREES
VENTRICULAR RATE: 59 BPM
VENTRICULAR RATE: 59 BPM
WBC # BLD AUTO: 8.6 THOUSAND/UL (ref 4.31–10.16)

## 2025-04-03 PROCEDURE — 86901 BLOOD TYPING SEROLOGIC RH(D): CPT

## 2025-04-03 PROCEDURE — 86850 RBC ANTIBODY SCREEN: CPT | Performed by: OBSTETRICS & GYNECOLOGY

## 2025-04-03 PROCEDURE — 86850 RBC ANTIBODY SCREEN: CPT

## 2025-04-03 PROCEDURE — G0143 SCR C/V CYTO,THINLAYER,RESCR: HCPCS | Performed by: OBSTETRICS & GYNECOLOGY

## 2025-04-03 PROCEDURE — 80048 BASIC METABOLIC PNL TOTAL CA: CPT

## 2025-04-03 PROCEDURE — 86900 BLOOD TYPING SEROLOGIC ABO: CPT

## 2025-04-03 PROCEDURE — 36415 COLL VENOUS BLD VENIPUNCTURE: CPT

## 2025-04-03 PROCEDURE — 93010 ELECTROCARDIOGRAM REPORT: CPT | Performed by: INTERNAL MEDICINE

## 2025-04-03 PROCEDURE — 86901 BLOOD TYPING SEROLOGIC RH(D): CPT | Performed by: OBSTETRICS & GYNECOLOGY

## 2025-04-03 PROCEDURE — S0612 ANNUAL GYNECOLOGICAL EXAMINA: HCPCS | Performed by: OBSTETRICS & GYNECOLOGY

## 2025-04-03 PROCEDURE — 86900 BLOOD TYPING SEROLOGIC ABO: CPT | Performed by: OBSTETRICS & GYNECOLOGY

## 2025-04-03 PROCEDURE — 85027 COMPLETE CBC AUTOMATED: CPT

## 2025-04-03 PROCEDURE — 93005 ELECTROCARDIOGRAM TRACING: CPT

## 2025-04-03 NOTE — PROGRESS NOTES
Assessment        Diagnoses and all orders for this visit:    Encounter for gynecological examination (general) (routine) without abnormal findings    Cervical cancer screening  -     Liquid-based pap, screening    PCOS (polycystic ovarian syndrome)    Endometrial polyp             Plan      All questions answered.  Await pap smear results.  Contraception: none.  Educational material distributed.  Follow up in 1 year.  Follow up as needed.  Pap smear.   Patient will follow-up with weight management to discuss restarting medications.  She is not considering surgical management for weight loss at this time    She is scheduled for hysteroscopy, D&C and polypectomy.  She is due to have preadmission testing labs done    Subjective      Laureen Mathis is a 30 y.o. female who presents for annual exam.      Chief Complaint   Patient presents with    Gynecologic Exam     Yearly. No concerns            Last Pap: 10/08/2021  Last mammogram: Not on file  Colorectal cancer screening: Cologuard: Not on file Colonoscopy: Not on file   DEXA: Not on file     Current contraception: none  History of abnormal Pap smear: yes   History of abnormal mammogram: no  Family history of uterine or ovarian cancer: no  Family history of breast cancer: MGM 90s  Family history of colon cancer: no    OB History    Para Term  AB Living   0 0 0 0 0 0   SAB IAB Ectopic Multiple Live Births   0 0 0 0 0       Menstrual History:  OB History          0    Para   0    Term   0       0    AB   0    Living   0         SAB   0    IAB   0    Ectopic   0    Multiple   0    Live Births   0                Menarche age: 14  Patient's last menstrual period was 2024 (approximate).       Social History     Substance and Sexual Activity   Sexual Activity Yes    Partners: Male          Past Medical History:   Diagnosis Date    Abnormal Pap smear of cervix     Asthma     HPV (human papilloma virus) anogenital infection 2020     Polycystic ovary syndrome      Past Surgical History:   Procedure Laterality Date    WISDOM TOOTH EXTRACTION       Family History   Problem Relation Age of Onset    Bipolar disorder Mother     Diabetes Mother     Mental illness Mother         bipolar    Hypertension Father     Thyroid disease Father     Substance Abuse Neg Hx         neg fam hx       Social History     Tobacco Use    Smoking status: Never    Smokeless tobacco: Never   Vaping Use    Vaping status: Never Used   Substance Use Topics    Alcohol use: Yes     Comment: occassionally    Drug use: No          Current Outpatient Medications:     albuterol (ACCUNEB) 1.25 MG/3ML nebulizer solution, Take 3 mL (1.25 mg total) by nebulization every 4 (four) hours as needed for wheezing, Disp: 360 mL, Rfl: 0    albuterol (ProAir HFA) 90 mcg/act inhaler, Inhale 2 puffs every 6 (six) hours as needed for wheezing or shortness of breath (cough), Disp: 8.5 g, Rfl: 0    albuterol (Ventolin HFA) 90 mcg/act inhaler, Inhale 2 puffs every 4 (four) hours as needed for wheezing or shortness of breath, Disp: 18 g, Rfl: 0    budesonide (PULMICORT) 0.5 mg/2 mL nebulizer solution, Take 4 mL (1 mg total) by nebulization 2 (two) times a day for 7 days Rinse mouth after use., Disp: 720 mL, Rfl: 1    budesonide-formoterol (SYMBICORT) 160-4.5 mcg/act inhaler, Inhale 2 puffs 2 (two) times a day Rinse mouth after use., Disp: 10.2 g, Rfl: 3    cetirizine (ZyrTEC) 10 MG chewable tablet, Chew 10 mg daily, Disp: , Rfl:     fluticasone (FLONASE) 50 mcg/act nasal spray, SPRAY 1 SPRAY INTO EACH NOSTRIL EVERY DAY, Disp: 16 mL, Rfl: 1    letrozole (FEMARA) 2.5 mg tablet, Take 1 tablet (2.5 mg total) by mouth daily (Patient not taking: Reported on 1/15/2025), Disp: 5 tablet, Rfl: 2    levalbuterol (XOPENEX HFA) 45 mcg/act inhaler, Inhale 2 puffs every 8 (eight) hours as needed for wheezing (Patient not taking: Reported on 1/15/2025), Disp: 15 g, Rfl: 5    medroxyPROGESTERone (PROVERA) 10 mg  "tablet, Take 1 tablet (10 mg total) by mouth daily (Patient not taking: Reported on 1/15/2025), Disp: 10 tablet, Rfl: 2    metFORMIN (GLUCOPHAGE-XR) 500 mg 24 hr tablet, Take 2 tablets (1,000 mg total) by mouth daily with breakfast, Disp: 180 tablet, Rfl: 0    methylPREDNISolone 4 MG tablet therapy pack, Use as directed on package (Patient not taking: Reported on 1/15/2025), Disp: 21 tablet, Rfl: 0    methylPREDNISolone 4 MG tablet therapy pack, Use as directed on package (Patient not taking: Reported on 1/15/2025), Disp: 1 each, Rfl: 0    montelukast (SINGULAIR) 10 mg tablet, Take 1 tablet (10 mg total) by mouth daily at bedtime (Patient not taking: Reported on 1/15/2025), Disp: 30 tablet, Rfl: 5    Naltrexone-buPROPion HCl ER 8-90 MG TB12, 1 tab daily x 7 days, 1 tab twice daily x 7 days, 2 tabs in AM and 1 tab in PM x 7 days, then 2 tabs twice daily (Patient not taking: Reported on 1/15/2025), Disp: 120 tablet, Rfl: 0    Semaglutide-Weight Management (WEGOVY) 0.5 MG/0.5ML, Inject 0.5 mL (0.5 mg total) under the skin once a week (Patient not taking: Reported on 1/15/2025), Disp: 2 mL, Rfl: 0    Allergies   Allergen Reactions    Dust Mite Extract Sneezing     Other reaction(s): Sneezing    Other      Dust and trees and pollens    Shellfish Allergy - Food Allergy Other (See Comments)     N/V,   itching           Review of Systems   Constitutional: Negative.    HENT: Negative.     Eyes: Negative.    Respiratory: Negative.     Cardiovascular: Negative.    Gastrointestinal: Negative.    Endocrine: Negative.    Genitourinary:         As noted in HPI   Musculoskeletal: Negative.    Skin: Negative.    Allergic/Immunologic: Negative.    Neurological: Negative.    Hematological: Negative.    Psychiatric/Behavioral: Negative.         /72 (BP Location: Right arm, Patient Position: Sitting, Cuff Size: Standard)   Pulse 86   Ht 5' 6.5\" (1.689 m)   Wt (!) 136 kg (300 lb 9.6 oz)   LMP 07/07/2024 (Approximate)   SpO2 " 100%   BMI 47.79 kg/m²         Physical Exam  Constitutional:       Appearance: She is well-developed.   Genitourinary:      Vulva, bladder and rectum normal.      No lesions in the vagina.      Genitourinary Comments:         Right Labia: No rash, tenderness, lesions, skin changes or Bartholin's cyst.     Left Labia: No tenderness, lesions, skin changes, Bartholin's cyst or rash.     No inguinal adenopathy present in the right or left side.     No vaginal discharge, tenderness or bleeding.      No vaginal prolapse present.     No vaginal atrophy present.       Right Adnexa: not tender, not full and no mass present.     Left Adnexa: not tender, not full and no mass present.     No cervical motion tenderness, friability, lesion or polyp.      Uterus is not enlarged or tender.      Pelvic exam was performed with patient in the lithotomy position.   Rectum:      No external hemorrhoid.   Breasts:     Right: No mass, nipple discharge, skin change or tenderness.      Left: No mass, nipple discharge, skin change or tenderness.   HENT:      Head: Normocephalic.      Nose: Nose normal.   Eyes:      Conjunctiva/sclera: Conjunctivae normal.   Neck:      Thyroid: No thyromegaly.   Cardiovascular:      Rate and Rhythm: Normal rate and regular rhythm.      Heart sounds: Normal heart sounds. No murmur heard.  Pulmonary:      Effort: Pulmonary effort is normal. No respiratory distress.      Breath sounds: Normal breath sounds. No wheezing or rales.   Abdominal:      General: There is no distension.      Palpations: Abdomen is soft. There is no mass.      Tenderness: There is no abdominal tenderness. There is no guarding or rebound.   Musculoskeletal:         General: No tenderness.      Cervical back: Neck supple. No muscular tenderness.   Lymphadenopathy:      Cervical: No cervical adenopathy.      Lower Body: No right inguinal adenopathy. No left inguinal adenopathy.   Neurological:      Mental Status: She is alert and oriented  to person, place, and time.   Skin:     General: Skin is warm and dry.   Psychiatric:         Mood and Affect: Mood normal.         Behavior: Behavior normal.   Vitals and nursing note reviewed. Exam conducted with a chaperone present.             Future Appointments   Date Time Provider Department Center   4/29/2025  8:15 AM Trina Woods MD [facility]

## 2025-04-04 ENCOUNTER — LAB REQUISITION (OUTPATIENT)
Dept: LAB | Facility: HOSPITAL | Age: 30
End: 2025-04-04
Payer: COMMERCIAL

## 2025-04-04 DIAGNOSIS — N84.0 POLYP OF CORPUS UTERI: ICD-10-CM

## 2025-04-04 LAB
ABO GROUP BLD: NORMAL
BLD GP AB SCN SERPL QL: NEGATIVE
RH BLD: POSITIVE
SPECIMEN EXPIRATION DATE: NORMAL

## 2025-04-07 NOTE — PRE-PROCEDURE INSTRUCTIONS
Pre-Surgery Instructions:   Medication Instructions    albuterol (ACCUNEB) 1.25 MG/3ML nebulizer solution Uses PRN- OK to take day of surgery    albuterol (ProAir HFA) 90 mcg/act inhaler Uses PRN- OK to take day of surgery    budesonide (PULMICORT) 0.5 mg/2 mL nebulizer solution Uses PRN- OK to take day of surgery    budesonide-formoterol (SYMBICORT) 160-4.5 mcg/act inhaler Take day of surgery.    cetirizine (ZyrTEC) 10 MG chewable tablet Take day of surgery.    fluticasone (FLONASE) 50 mcg/act nasal spray Take day of surgery.    metFORMIN (GLUCOPHAGE-XR) 500 mg 24 hr tablet Hold day of surgery.   Medication instructions for day of surgery reviewed. Please take all instructed medications with only a sip of water.       You will receive a call one business day prior to surgery with an arrival time and hospital directions. If your surgery is scheduled on a Monday, the hospital will be calling you on the Friday prior to your surgery. If you have not heard from anyone by 8pm, please call the hospital supervisor through the hospital  at [phone]. ([place] [phone] or [place] [phone]).    Do not eat or drink anything after midnight the night before your surgery, including candy, mints, lifesavers, or chewing gum. Do not drink alcohol 24hrs before your surgery. Try not to smoke at least 24hrs before your surgery.       Follow the pre surgery showering instructions as listed in the “My Surgical Experience Booklet” or otherwise provided by your surgeon's office. Do not use a blade to shave the surgical area 1 week before surgery. It is okay to use a clean electric clippers up to 24 hours before surgery. Do not apply any lotions, creams, including makeup, cologne, deodorant, or perfumes after showering on the day of your surgery. Do not use dry shampoo, hair spray, hair gel, or any type of hair products.     No contact lenses, eye make-up, or artificial eyelashes. Remove nail polish, including gel  polish, and any artificial, gel, or acrylic nails if possible. Remove all jewelry including rings and body piercing jewelry.     Wear causal clothing that is easy to take on and off. Consider your type of surgery.    Keep any valuables, jewelry, piercings at home. Please bring any specially ordered equipment (sling, braces) if indicated.    Arrange for a responsible person to drive you to and from the hospital on the day of your surgery. Please confirm the visitor policy for the day of your procedure when you receive your phone call with an arrival time.     Call the surgeon's office with any new illnesses, exposures, or additional questions prior to surgery.    Please reference your “My Surgical Experience Booklet” for additional information to prepare for your upcoming surgery.

## 2025-04-09 ENCOUNTER — RESULTS FOLLOW-UP (OUTPATIENT)
Dept: OBGYN CLINIC | Facility: CLINIC | Age: 30
End: 2025-04-09

## 2025-04-09 LAB
LAB AP GYN PRIMARY INTERPRETATION: NORMAL
Lab: NORMAL

## 2025-04-11 ENCOUNTER — ANESTHESIA EVENT (OUTPATIENT)
Dept: PERIOP | Facility: HOSPITAL | Age: 30
End: 2025-04-11
Payer: COMMERCIAL

## 2025-04-13 PROCEDURE — NC001 PR NO CHARGE: Performed by: OBSTETRICS & GYNECOLOGY

## 2025-04-13 NOTE — DISCHARGE INSTR - AVS FIRST PAGE
"Hysteroscopy    The Basics  Written by the doctors and editors at [facility]  What is hysteroscopy?  A hysteroscopy is a procedure that lets a doctor see inside the uterus (figure 1).  During a hysteroscopy, the doctor uses a thin tube with a tiny camera on the end. This is called a \"hysteroscope.\" It goes into the vagina, through the cervix, and into the uterus.  Hysteroscopy can be done for many reasons. For example, your doctor might want to:  ? Learn more about or treat abnormal bleeding from your uterus  ? Take out an intrauterine device (\"IUD\")  ? Remove scar tissue, polyps, or other tissue from inside your uterus  ? Look at the shape of your uterus  ? Take a small sample of tissue called a \"biopsy\"  A hysteroscopy is done in an operating room or clinic.  How do I prepare for a hysteroscopy?  The doctor or nurse will tell you if you need to do anything special to prepare. In some cases, the doctor will give you medicine to help dilate your cervix the night before your procedure.  Before your procedure, your doctor will do an exam. They might send you to get tests, such as:  ? Lab tests, including a pregnancy test  ? Ultrasound or other imaging tests - Imaging tests take pictures of the inside of the body.  Your doctor will also ask you about your \"health history.\" This involves asking you questions about any health problems you have or had in the past, past surgeries, and any medicines you take. Tell them about:  ? Any medicines you are taking - This includes any prescription or \"over-the-counter\" medicines you use, plus any herbal supplements you take. It helps to write down and bring a list of any medicines you take, or bring a bag with all of your medicines with you.  ? Any allergies you have  ? Any bleeding problems you have - Certain medicines, including some herbs and supplements, can increase the risk of bleeding. Some health conditions also increase this risk.  ? When your last menstrual period was  You " "will also get information about:  ? Eating and drinking before your procedure - In some cases, you might need to \"fast\" before your procedure. This means not eating or drinking anything for a period of time. In other cases, you might be allowed to have liquids until a short time before the procedure. Whether you need to fast, and for how long, depends on the procedure you are having.  ? What help you will need when you go home - For example, you might need to have someone else bring you home or stay with you for some time while you recover.  Ask the doctor or nurse if you have questions or if there is anything you do not understand.  What happens during a hysteroscopy?  When it is time for the procedure:  ? You might get an \"IV,\" which is a thin tube that goes into a vein. This can be used to give you fluids and medicines.  ? You might be awake for the procedure, or you might get \"sedative\" medicines. These make you relax and feel sleepy.  ? You might get anesthesia medicines to help with pain during or after the procedure. In some cases, the doctor might suggest that you take ibuprofen (sample brand names Motrin, Aleve) to help with pain.  ? The doctors and nurses will monitor your breathing, blood pressure, and heart rate during the procedure.  ? The doctor might need to open, or \"dilate,\" your cervix to insert the hysteroscope. (The cervix is the bottom part of the uterus, where it meets the vagina.)  ? The doctor will put the hysteroscope through your cervix into your uterus. They will fill your uterus with a special fluid or gas.  ? The doctor will use the camera to look at the inside lining of your uterus.  ? The doctor might do a test called a biopsy. For a biopsy, they take a small piece of tissue from your uterus. Then, they look at the tissue under a microscope to check for cancer or inflammation.  ? The doctor might remove an IUD, abnormal tissue, or polyps from your uterus.  ? When they are done, the " "doctor will remove any gas or fluid from your uterus and take out the hysteroscope.  What happens after a hysteroscopy?  After your procedure, you will be taken to a recovery room. The staff will watch you closely as your anesthesia wears off.  As you recover:  ? You might have light bleeding, or \"spotting,\" from your vagina. This can last for a few days.  ? You might also have mild cramping for a few days. You can take a pain-relieving medicine such as acetaminophen (sample brand name: Tylenol) or ibuprofen (sample brand names: Advil, Motrin).  ? Your doctor or nurse will tell you when you can go back to your usual activities. They will also tell you when it is safe to have sex and put things, such as tampons, in your vagina.  ? Your doctor will probably recommend that you do not drive or go to work for the rest of the day.  What are the risks of hysteroscopy?  Your doctor will talk to you about all of the possible risks, and answer your questions. Possible risks include:  ? Infection  ? Injury to the uterus or cervix  ? Injury to the bowel or bladder  ? Bleeding  ? Embolism (when a bubble of gas enters the bloodstream)  What follow-up care do I need?  The doctor will want to see you again after your hysteroscopy to check on your progress. Go to these appointments.  If you had a biopsy, your doctor will let you know when your results are ready and discuss what they mean.  When should I call the doctor?  Call for advice if:  ? You have a fever of 100.4°F (38°C) or higher, or chills.  ? You have shortness of breath or chest pain.  ? You have heavy vaginal bleeding for more than an hour. (This means bleeding that is heavier than a menstrual period, or that completely soaks a large sanitary pad.)  ? You have bad-smelling, green, or dark yellow vaginal discharge.  All topics are updated as new evidence becomes available and our peer review process is complete.  This topic retrieved from [employer] on: Mar 03, 2025.  Topic " "868640 Version 1.0  Release: 33.1.2 - C33.61  © 2025 UpToDate, Inc. and/or its affiliates. All rights reserved.  figure 1: Female reproductive anatomy    Consumer Information Use and Disclaimer  Disclaimer: This generalized information is a limited summary of diagnosis, treatment, and/or medication information. It is not meant to be comprehensive and should be used as a tool to help the user understand and/or assess potential diagnostic and treatment options. It does NOT include all information about conditions, treatments, medications, side effects, or risks that may apply to a specific patient. It is not intended to be medical advice or a substitute for the medical advice, diagnosis, or treatment of a health care provider based on the health care provider's examination and assessment of a patient's specific and unique circumstances. Patients must speak with a health care provider for complete information about their health, medical questions, and treatment options, including any risks or benefits regarding use of medications. This information does not endorse any treatments or medications as safe, effective, or approved for treating a specific patient. UpToDate, Inc. and its affiliates disclaim any warranty or liability relating to this information or the use thereof.The use of this information is governed by the Terms of Use, available at https://www.Bado[employer].com/en/know/clinical-effectiveness-terms. 2025© UpToDate, Inc. and its affiliates and/or licensors. All rights reserved.  © 2025 UpToDate, Inc. and/or its affiliates. All rights reserved.    Dilation and curettage (D&C)    The Basics  Written by the doctors and editors at [facility]  What is dilation and curettage?  This is a procedure to remove tissue from the inside of the uterus (figure 1). It is also called a \"D and C\" or \"D&C.\"  During a D&C, a doctor first opens, or \"dilates,\" the cervix. (The cervix is the bottom part, or the \"neck,\" of the " "uterus.) Then, they put a surgical tool called a \"curette\" through the vagina and cervix, and up into the uterus. They use the curette to scrape and remove tissue from the uterus.  A D&C is done in an operating room in a hospital or clinic.  Why might my doctor do a D&C?  Your doctor might do a D&C to figure out the cause of a symptom or problem. During a D&C, a doctor gets a sample of tissue from your uterus. Then, the sample can be checked for abnormal cells, cancer, or other problems.  You might have a D&C to:  ? Stop severe vaginal bleeding - For example, you might have a D&C if your period is too heavy.  ? Figure out the cause of abnormal bleeding - For example, you might have a D&C if you have very heavy periods, or if you have vaginal bleeding after going through menopause. This might help your doctor figure out what is causing the problem.  ? Get more information after an abnormal result from another test - For example, if you had a test to check for uterine cancer, your doctor might do a D&C to learn more.  Doctors can also do a D&C for other reasons. In pregnant or recently pregnant people, a doctor can do a D&C to:  ? Remove any pregnancy tissue that is left in the uterus after pregnancy loss - Pregnancy loss, or \"miscarriage,\" is when a pregnancy ends on its own.  ? Remove any pregnancy tissue that is left in the uterus after childbirth  ? Remove an abnormal growth called a \"molar pregnancy\" that has formed in the uterus  ? Do an  (end a pregnancy) during the first trimester  What should I do before a D&C?  Your doctor will give you instructions about what to do before a D&C. They will probably tell you not to eat or drink anything starting the night before the procedure.  Your doctor might give you medicine to put inside your vagina near your cervix the day before your D&C. The medicine can soften your cervix or start to dilate it.  Tell the doctor or nurse if you have any trouble getting ready " "for your D&C, or if you have questions about what to expect.  What happens during a D&C?  You will have a thin tube that goes into a vein, called an \"IV,\" put in your arm or hand. You will get fluids and medicines through the IV. Some of these medicines will make you feel relaxed, sleepy, or numb during the procedure.  When a doctor does a D&C to find out why a person is having symptoms, they might do another test called a \"hysteroscopy\" at the same time. During a hysteroscopy, the doctor puts a small camera inside the uterus to look for problems. If they find a growth in the uterus, they might do a procedure to remove it.  When a doctor does a D&C to treat a problem or condition, they will remove anything concerning that is inside the uterus. They might also scrape away some of the lining of the uterus.  What happens after a D&C?  After a D&C:  ? Your doctor or nurse will watch you for a while to make sure that you don't have any problems. This might take up to a few hours. When you are able to go home from the hospital, someone else should drive you.  ? You might have mild cramping and slight bleeding, or \"spotting,\" from the vagina. These can last for a few days. If you have pain, you can take a pain-relieving medicine.  ? Your doctor or nurse will tell you when you can start your usual activities again. They will also tell you when it is safe to have sex and put things, such as tampons, in your vagina.  ? Typically, you will get your period within 4 to 6 weeks after a D&C.  What are the risks of a D&C?  Your doctor will talk to you about all of the possible risks, and answer your questions. Possible risks include:  ? Tear in the uterus  ? Injury to the cervix  ? Infection  ? Areas of scar tissue that form in the uterus  These risks are rare. But if any of these things happen, your doctor will let you know if any other treatments are needed.  When should I call the doctor?  Call your doctor or nurse if you have " any of the following problems after your D&C:  ? Fever higher than 100.4°F (38°C)  ? Cramps that last more than 2 days  ? Pain that gets worse  ? Heavy vaginal bleeding, or vaginal bleeding that lasts more than 2 weeks  ? Vaginal discharge that is green or smells bad  All topics are updated as new evidence becomes available and our peer review process is complete.  This topic retrieved from [employer] on: Mar 03, 2025.  Topic 45860 Version 17.0  Release: 33.1.2 - C33.61  © 2025 UpToDate, Inc. and/or its affiliates. All rights reserved.  figure 1: Female reproductive anatomy    Consumer Information Use and Disclaimer  Disclaimer: This generalized information is a limited summary of diagnosis, treatment, and/or medication information. It is not meant to be comprehensive and should be used as a tool to help the user understand and/or assess potential diagnostic and treatment options. It does NOT include all information about conditions, treatments, medications, side effects, or risks that may apply to a specific patient. It is not intended to be medical advice or a substitute for the medical advice, diagnosis, or treatment of a health care provider based on the health care provider's examination and assessment of a patient's specific and unique circumstances. Patients must speak with a health care provider for complete information about their health, medical questions, and treatment options, including any risks or benefits regarding use of medications. This information does not endorse any treatments or medications as safe, effective, or approved for treating a specific patient. UpToDate, Inc. and its affiliates disclaim any warranty or liability relating to this information or the use thereof.The use of this information is governed by the Terms of Use, available at https://www.woltersAnapsisuwer.com/en/know/clinical-effectiveness-terms. 2025© UpToDate, Inc. and its affiliates and/or licensors. All rights reserved.  © 2025  [employer] and/or its affiliates. All rights reserved.

## 2025-04-13 NOTE — H&P
H&P Exam - Gynecology   Laureen Mathis 30 y.o. female MRN: [id number]  Unit/Bed#:  Encounter: [id number]    Assessment & Plan     Assessment:  Endometrial polyp  Plan:  Hysteroscopy, D&C polypectomy  Exam under anesthesia    Please see consultation from January 15, 2025 for detailed discussion    History of Present Illness     HPI:  Laureen Mathis is a 30 y.o. female who presents with multiple endometrial polyps based off ultrasound done at [facility]. Patient is also suffering from infertility. Patient was told she needs polyps to be removed prior to intrauterine insemination but due to BMI over 40 cannot be performed by their facility.     Review of Systems   Constitutional: Negative.    HENT: Negative.     Eyes: Negative.    Respiratory: Negative.     Cardiovascular: Negative.    Gastrointestinal: Negative.    Endocrine: Negative.    Genitourinary:         As noted in HPI   Musculoskeletal: Negative.    Skin: Negative.    Allergic/Immunologic: Negative.    Neurological: Negative.    Hematological: Negative.    Psychiatric/Behavioral: Negative.         Historical Information   Past Medical History:   Diagnosis Date    Abnormal Pap smear of cervix     Asthma     HPV (human papilloma virus) anogenital infection 2020    Polycystic ovary syndrome      Past Surgical History:   Procedure Laterality Date    WISDOM TOOTH EXTRACTION       OB/GYN History:     OB History    Para Term  AB Living   0 0 0 0 0 0   SAB IAB Ectopic Multiple Live Births   0 0 0 0 0         Family History   Problem Relation Age of Onset    Bipolar disorder Mother     Diabetes Mother     Mental illness Mother         bipolar    Hypertension Father     Thyroid disease Father     Substance Abuse Neg Hx         neg fam hx     Social History   Social History     Substance and Sexual Activity   Alcohol Use Yes    Comment: occassionally 1-2 per month     Social History     Substance and Sexual Activity   Drug Use No     Social History      Tobacco Use   Smoking Status Never   Smokeless Tobacco Never     E-Cigarette/Vaping    E-Cigarette Use Never User      E-Cigarette/Vaping Substances    Nicotine No     THC No     CBD No     Flavoring No     Other No     Unknown No        Meds/Allergies   all current active meds have been reviewed  Allergies   Allergen Reactions    Dust Mite Extract Sneezing     Other reaction(s): Sneezing    Other      Dust and trees and pollens    Shellfish Allergy - Food Allergy Other (See Comments)     N/V,   itching       Objective   Vitals: Last menstrual period 07/07/2024, not currently breastfeeding.    No intake or output data in the 24 hours ending 04/13/25 1448    Invasive Devices:   Invasive Devices       None                   Physical Exam  Constitutional:       General: She is not in acute distress.     Appearance: She is well-developed.   HENT:      Head: Normocephalic and atraumatic.      Mouth/Throat:      Mouth: Mucous membranes are moist.   Eyes:      Conjunctiva/sclera: Conjunctivae normal.   Cardiovascular:      Rate and Rhythm: Normal rate and regular rhythm.      Pulses: Normal pulses.      Heart sounds: Normal heart sounds.   Pulmonary:      Effort: Pulmonary effort is normal. No respiratory distress.      Breath sounds: Normal breath sounds.   Abdominal:      General: Abdomen is flat. There is no distension.      Palpations: Abdomen is soft.      Tenderness: There is no abdominal tenderness.   Genitourinary:     General: Normal vulva.      Vagina: No vaginal discharge.   Musculoskeletal:      Cervical back: Normal range of motion.   Skin:     Coloration: Skin is not pale.      Findings: No erythema or rash.   Neurological:      Mental Status: She is alert and oriented to person, place, and time.   Psychiatric:         Mood and Affect: Mood normal.         Behavior: Behavior normal.         Thought Content: Thought content normal.         Judgment: Judgment normal.       Study Result    Narrative &  Impression   PELVIC ULTRASOUND, COMPLETE     INDICATION: The patient is 29 years old. E28.2: Polycystic ovarian syndrome  N91.2: Amenorrhea, unspecified.     COMPARISON: Compared with 4/13/2018     TECHNIQUE: Transabdominal pelvic ultrasound was performed in sagittal and transverse planes with a curvilinear transducer. Additional transvaginal imaging was performed to better evaluate the endometrium and ovaries. Imaging included volumetric sweeps as   well as traditional still imaging technique.     FINDINGS:     UTERUS:  The uterus is anteverted in position, measuring 7.2 x 3.1 x 5.6 cm.  The uterus has a normal contour and echotexture.  The cervix appears within normal limits.     ENDOMETRIUM:  The endometrial echo complex has an AP caliber of 2.0 mm.  Appearance within normal limits.     OVARIES/ADNEXA:  Right ovary: 4.5 x 3.4 x 3.1 cm. 25.0 mL.  Ovarian Doppler flow is within normal limits.  Multiple peripheral follicles. Large anechoic cyst measuring 4.1 x 4.2 x 3.8 cm.     Left ovary: 3.1 x 3.3 x 3.8 cm. 20.4 mL.  Ovarian Doppler flow is within normal limits.  Multiple peripheral follicles. Large anechoic cyst measuring 3.6 x 3.3 x 3.0 cm compared to a previous focus measuring 1.9 x 1.9 x 1.8 cm.     OTHER:  No free fluid or loculated fluid collections.     IMPRESSION:     Prominent ovaries with bilateral multiple follicles. Large anechoic cyst seen.  Findings consistent with the patient's known history of PCOS.     Bilateral anechoic simple appearing cysts largest on the right measuring 4.1 x 4.2 x 3.8 cm and on the left measuring 3.6 x 3.3 x 3.0 cm O-RADS 2 = Almost certainly benign.  No further followup or workup needed.                    Workstation performed: [id number]

## 2025-04-15 ENCOUNTER — HOSPITAL ENCOUNTER (OUTPATIENT)
Facility: HOSPITAL | Age: 30
Setting detail: OUTPATIENT SURGERY
Discharge: HOME | End: 2025-04-15
Attending: OBSTETRICS & GYNECOLOGY | Admitting: OBSTETRICS & GYNECOLOGY
Payer: COMMERCIAL

## 2025-04-15 ENCOUNTER — ANESTHESIA (OUTPATIENT)
Dept: PERIOP | Facility: HOSPITAL | Age: 30
End: 2025-04-15
Payer: COMMERCIAL

## 2025-04-15 VITALS
OXYGEN SATURATION: 94 % | TEMPERATURE: 97.4 F | DIASTOLIC BLOOD PRESSURE: 86 MMHG | WEIGHT: 293 LBS | RESPIRATION RATE: 16 BRPM | BODY MASS INDEX: 45.99 KG/M2 | SYSTOLIC BLOOD PRESSURE: 137 MMHG | HEIGHT: 67 IN | HEART RATE: 65 BPM

## 2025-04-15 DIAGNOSIS — N93.9 ABNORMAL UTERINE BLEEDING (AUB): ICD-10-CM

## 2025-04-15 DIAGNOSIS — Z98.890 S/P DILATATION AND CURETTAGE: Primary | ICD-10-CM

## 2025-04-15 DIAGNOSIS — N84.0 ENDOMETRIAL POLYP: ICD-10-CM

## 2025-04-15 LAB
EXT PREGNANCY TEST URINE: NEGATIVE
EXT. CONTROL: NORMAL

## 2025-04-15 PROCEDURE — 88305 TISSUE EXAM BY PATHOLOGIST: CPT | Performed by: STUDENT IN AN ORGANIZED HEALTH CARE EDUCATION/TRAINING PROGRAM

## 2025-04-15 PROCEDURE — 58558 HYSTEROSCOPY BIOPSY: CPT | Performed by: OBSTETRICS & GYNECOLOGY

## 2025-04-15 PROCEDURE — 81025 URINE PREGNANCY TEST: CPT | Performed by: OBSTETRICS & GYNECOLOGY

## 2025-04-15 RX ORDER — MIDAZOLAM HYDROCHLORIDE 2 MG/2ML
INJECTION, SOLUTION INTRAMUSCULAR; INTRAVENOUS AS NEEDED
Status: DISCONTINUED | OUTPATIENT
Start: 2025-04-15 | End: 2025-04-15

## 2025-04-15 RX ORDER — FENTANYL CITRATE 50 UG/ML
INJECTION, SOLUTION INTRAMUSCULAR; INTRAVENOUS AS NEEDED
Status: DISCONTINUED | OUTPATIENT
Start: 2025-04-15 | End: 2025-04-15

## 2025-04-15 RX ORDER — MEPERIDINE HYDROCHLORIDE 25 MG/ML
12.5 INJECTION INTRAMUSCULAR; INTRAVENOUS; SUBCUTANEOUS
Status: DISCONTINUED | OUTPATIENT
Start: 2025-04-15 | End: 2025-04-15 | Stop reason: HOSPADM

## 2025-04-15 RX ORDER — DEXAMETHASONE SODIUM PHOSPHATE 10 MG/ML
INJECTION, SOLUTION INTRAMUSCULAR; INTRAVENOUS AS NEEDED
Status: DISCONTINUED | OUTPATIENT
Start: 2025-04-15 | End: 2025-04-15

## 2025-04-15 RX ORDER — ONDANSETRON 2 MG/ML
4 INJECTION INTRAMUSCULAR; INTRAVENOUS ONCE AS NEEDED
Status: DISCONTINUED | OUTPATIENT
Start: 2025-04-15 | End: 2025-04-15 | Stop reason: HOSPADM

## 2025-04-15 RX ORDER — SODIUM CHLORIDE, SODIUM LACTATE, POTASSIUM CHLORIDE, CALCIUM CHLORIDE 600; 310; 30; 20 MG/100ML; MG/100ML; MG/100ML; MG/100ML
125 INJECTION, SOLUTION INTRAVENOUS CONTINUOUS
Status: DISCONTINUED | OUTPATIENT
Start: 2025-04-15 | End: 2025-04-15 | Stop reason: HOSPADM

## 2025-04-15 RX ORDER — ACETAMINOPHEN 325 MG/1
650 TABLET ORAL EVERY 6 HOURS PRN
Status: DISCONTINUED | OUTPATIENT
Start: 2025-04-15 | End: 2025-04-15 | Stop reason: HOSPADM

## 2025-04-15 RX ORDER — LIDOCAINE HYDROCHLORIDE 20 MG/ML
INJECTION, SOLUTION EPIDURAL; INFILTRATION; INTRACAUDAL; PERINEURAL AS NEEDED
Status: DISCONTINUED | OUTPATIENT
Start: 2025-04-15 | End: 2025-04-15

## 2025-04-15 RX ORDER — ONDANSETRON 2 MG/ML
INJECTION INTRAMUSCULAR; INTRAVENOUS AS NEEDED
Status: DISCONTINUED | OUTPATIENT
Start: 2025-04-15 | End: 2025-04-15

## 2025-04-15 RX ORDER — FENTANYL CITRATE/PF 50 MCG/ML
25 SYRINGE (ML) INJECTION
Status: DISCONTINUED | OUTPATIENT
Start: 2025-04-15 | End: 2025-04-15 | Stop reason: HOSPADM

## 2025-04-15 RX ORDER — MAGNESIUM HYDROXIDE 1200 MG/15ML
LIQUID ORAL AS NEEDED
Status: DISCONTINUED | OUTPATIENT
Start: 2025-04-15 | End: 2025-04-15 | Stop reason: HOSPADM

## 2025-04-15 RX ORDER — KETOROLAC TROMETHAMINE 30 MG/ML
INJECTION, SOLUTION INTRAMUSCULAR; INTRAVENOUS AS NEEDED
Status: DISCONTINUED | OUTPATIENT
Start: 2025-04-15 | End: 2025-04-15

## 2025-04-15 RX ORDER — PROPOFOL 10 MG/ML
INJECTION, EMULSION INTRAVENOUS AS NEEDED
Status: DISCONTINUED | OUTPATIENT
Start: 2025-04-15 | End: 2025-04-15

## 2025-04-15 RX ADMIN — FENTANYL CITRATE 50 MCG: 50 INJECTION INTRAMUSCULAR; INTRAVENOUS at 13:06

## 2025-04-15 RX ADMIN — PROPOFOL 200 MG: 10 INJECTION, EMULSION INTRAVENOUS at 13:02

## 2025-04-15 RX ADMIN — KETOROLAC TROMETHAMINE 30 MG: 30 INJECTION, SOLUTION INTRAMUSCULAR; INTRAVENOUS at 13:37

## 2025-04-15 RX ADMIN — FENTANYL CITRATE 50 MCG: 50 INJECTION INTRAMUSCULAR; INTRAVENOUS at 13:08

## 2025-04-15 RX ADMIN — FENTANYL CITRATE 25 MCG: 50 INJECTION INTRAMUSCULAR; INTRAVENOUS at 14:12

## 2025-04-15 RX ADMIN — MIDAZOLAM 2 MG: 1 INJECTION INTRAMUSCULAR; INTRAVENOUS at 12:53

## 2025-04-15 RX ADMIN — DEXAMETHASONE SODIUM PHOSPHATE 10 MG: 10 INJECTION, SOLUTION INTRAMUSCULAR; INTRAVENOUS at 13:06

## 2025-04-15 RX ADMIN — LIDOCAINE HYDROCHLORIDE 100 MG: 20 INJECTION, SOLUTION EPIDURAL; INFILTRATION; INTRACAUDAL at 13:02

## 2025-04-15 RX ADMIN — SODIUM CHLORIDE, SODIUM LACTATE, POTASSIUM CHLORIDE, AND CALCIUM CHLORIDE 125 ML/HR: .6; .31; .03; .02 INJECTION, SOLUTION INTRAVENOUS at 12:10

## 2025-04-15 RX ADMIN — PROPOFOL 100 MG: 10 INJECTION, EMULSION INTRAVENOUS at 13:03

## 2025-04-15 RX ADMIN — FENTANYL CITRATE 25 MCG: 50 INJECTION INTRAMUSCULAR; INTRAVENOUS at 14:17

## 2025-04-15 RX ADMIN — ONDANSETRON 4 MG: 2 INJECTION INTRAMUSCULAR; INTRAVENOUS at 13:27

## 2025-04-15 RX ADMIN — FENTANYL CITRATE 25 MCG: 50 INJECTION INTRAMUSCULAR; INTRAVENOUS at 14:32

## 2025-04-15 NOTE — ANESTHESIA PREPROCEDURE EVALUATION
Procedure:  DILATATION AND CURETTAGE (D&C) WITH HYSTEROSCOPY, POLYPECTOMY, EXAM  UNDER ANESTHESIA (Uterus)    Relevant Problems   CARDIO   (+) Hypertension      HEMATOLOGY   (+) Iron deficiency anemia due to chronic blood loss      NEURO/PSYCH   (+) Situational anxiety      PULMONARY   (+) Asthma   (+) Moderate persistent asthma without complication      Obstetrics/Gynecology   (+) PCOS (polycystic ovarian syndrome)      Other   (+) Morbid obesity with BMI of 45.0-49.9, adult (HCC)        Physical Exam    Airway    Mallampati score: III  TM Distance: >3 FB       Dental   No notable dental hx     Cardiovascular  Rhythm: regular, Rate: normal    Pulmonary   Breath sounds clear to auscultation    Other Findings  post-pubertal.      Anesthesia Plan  ASA Score- 3     Anesthesia Type- general with ASA Monitors.         Additional Monitors:     Airway Plan: ETT.           Plan Factors-Exercise tolerance (METS): >4 METS.    Chart reviewed.   Existing labs reviewed.     Patient is not a current smoker.      Obstructive sleep apnea risk education given perioperatively.        Induction- intravenous.    Postoperative Plan-         Informed Consent- Anesthetic plan and risks discussed with patient.        NPO Status:  No vitals data found for the desired time range.

## 2025-04-15 NOTE — INTERVAL H&P NOTE
H&P reviewed. After examining the patient I find no changes in the patients condition since the H&P had been written.    Vitals:    04/15/25 1201   BP: 138/73   Pulse: 93   Resp: 17   Temp: 97.5 °F (36.4 °C)   SpO2: 100%

## 2025-04-15 NOTE — OP NOTE
OPERATIVE REPORT  PATIENT NAME: Laureen Mathis    :  1995  MRN: [id number]  Pt Location: [facility] OR ROOM 01    SURGERY DATE: 4/15/2025    Surgeons and Role:     * Trina Woods MD - Primary    Preop Diagnosis:  Endometrial polyp [N84.0]  Abnormal uterine bleeding (AUB) [N93.9]    Post-Op Diagnosis Codes:     * Endometrial polyp [N84.0]     * Abnormal uterine bleeding (AUB) [N93.9]    Procedure(s):  DILATATION AND CURETTAGE (D&C) WITH HYSTEROSCOPY. POLYPECTOMY. EXAM  UNDER ANESTHESIA    Specimen(s):  ID Type Source Tests Collected by Time Destination   1 : Endometrial polyp Tissue Endometrium TISSUE EXAM Trina Woods MD 4/15/2025 1330    2 : [facility] Tissue Endometrium TISSUE EXAM Trina Woods MD 4/15/2025 1331        Estimated Blood Loss:   Minimal    Drains:  * No LDAs found *    Anesthesia Type:   IV Sedation with Anesthesia    Operative Indications:  Endometrial polyp [N84.0]  Abnormal uterine bleeding (AUB) [N93.9]    Fluid deficit: 300 ml NSS        Operative Findings:  Uterus sounded to 7 cm  Normal tubal ostia  Multiple small polyps posteriorly and anteriorly        Complications:   None    Procedure and Technique:    The patient was correctly identified as and taken to the OR where general anesthesia was obtained without difficulty.  She was placed in the dorsal lithotomy position and was prepped and draped in a sterile fashion.   The patient was examined under anesthesia.     A weighted speculum and Damion retractor was inserted into the vagina and the anterior lip of the cervix was visualized and grasped with a single toothed tenaculum.       Uterus was sounded to 8.    Using He dilators, the cervix was progressively dilated to about 18 Hanks.     A 6.3 mm 0 degree SYMPHION Hysteroscope was inserted. The entire endometrial cavity was visualized as well as bilateral ostia.     The endometrial cavity appeared as above.     Symphion tissue removal system was inserted and small endometrial polyps were removed  hysteroscopically under direct visualization down to its base. Good hemostasis was noted.    There was no evidence of uterine perforation.    The hysteroscope was removed.    The uterine cavity was systematically curetted with a sharp curette and the curettings sent for analysis. Minimal tissue was obtained.    The tenaculum and retractor were removed. There was no bleeding from the tenaculum site.    The patient tolerated the procedure well. Sponge and instrument count were correct x 2.      I was present for the entire procedure. and A qualified resident physician was not available.    Patient Disposition:  PACU  and hemodynamically stable             SIGNATURE: Trina Woods MD  DATE: April 15, 2025  TIME: 1:34 PM

## 2025-04-15 NOTE — ANESTHESIA POSTPROCEDURE EVALUATION
Post-Op Assessment Note    CV Status:  Stable  Pain Score: 8      Multimodal analgesia used between 6 hours prior to anesthesia start to PACU discharge    Mental Status:  Alert and awake   Hydration Status:  Euvolemic   PONV Controlled:  Controlled   Airway Patency:  Patent  Two or more mitigation strategies used for obstructive sleep apnea   Post Op Vitals Reviewed: Yes    No anethesia notable event occurred.    Staff: Anesthesiologist           Last Filed PACU Vitals:  Vitals Value Taken Time   Temp 97.2 °F (36.2 °C) 04/15/25 1345   Pulse 70 04/15/25 1422   /90 04/15/25 1416   Resp 16 04/15/25 1416   SpO2 94 % 04/15/25 1422   Vitals shown include unfiled device data.    Modified Lydia:     Vitals Value Taken Time   Activity 2 04/15/25 1416   Respiration 2 04/15/25 1416   Circulation 2 04/15/25 1416   Consciousness 2 04/15/25 1416   Oxygen Saturation 2 04/15/25 1416     Modified Lydia Score: 10

## 2025-04-15 NOTE — ANESTHESIA POSTPROCEDURE EVALUATION
Post-Op Assessment Note    CV Status:  Stable  Pain Score: 0    Pain management: adequate       Mental Status:  Alert   Hydration Status:  Stable   PONV Controlled:  None   Airway Patency:  Patent     Post Op Vitals Reviewed: Yes    No anethesia notable event occurred.    Staff: CRNA           Last Filed PACU Vitals:  Vitals Value Taken Time   Temp     Pulse 84 04/15/25 1345   /82 04/15/25 1344   Resp     SpO2 94 % 04/15/25 1345   Vitals shown include unfiled device data.

## 2025-04-15 NOTE — QUICK NOTE
Spoke to patient's  Aj completion of surgery and intra-op findings. All questions answered. Called patient's mom as well and went to voicemail - left a message re: surgery completion.

## 2025-04-22 ENCOUNTER — RESULTS FOLLOW-UP (OUTPATIENT)
Dept: OBGYN CLINIC | Facility: CLINIC | Age: 30
End: 2025-04-22

## 2025-04-22 PROCEDURE — 88305 TISSUE EXAM BY PATHOLOGIST: CPT | Performed by: STUDENT IN AN ORGANIZED HEALTH CARE EDUCATION/TRAINING PROGRAM

## 2025-04-29 NOTE — PROGRESS NOTES
Assessment      Doing well postoperatively.  Operative findings again reviewed. Pathology report discussed.      Plan     1. Continue any current medications.  2. Wound care discussed.  3. Activity restrictions: none  4. Anticipated return to work: not applicable.  5. Follow up:  11 months  for annual  6.  Patient declines contraception.  She is going to pursue weight loss this year and follow-up with infertility next year. Advised to call if with prolonged absent menses.        Problem List Items Addressed This Visit          Genitourinary    Endometrial polyp - Primary       Surgery/Wound/Pain    S/P dilatation and curettage         Subjective     Laureen Mathis is a 30 y.o. female who presents to the office 2 weeks status post  D and C  for endometrial polyp.   The following portions of the patient's history were reviewed and updated as appropriate: allergies, current medications, past family history, past medical history, past social history, past surgical history, and problem list.    Review of Systems   Constitutional: Negative.    HENT: Negative.     Eyes: Negative.    Respiratory: Negative.     Cardiovascular: Negative.    Gastrointestinal: Negative.    Endocrine: Negative.    Genitourinary:         As noted in HPI   Musculoskeletal: Negative.    Skin: Negative.    Allergic/Immunologic: Negative.    Neurological: Negative.    Hematological: Negative.    Psychiatric/Behavioral: Negative.          Objective     /80 (BP Location: Right arm, Patient Position: Sitting, Cuff Size: Large)   Wt (!) 138 kg (304 lb 6.4 oz)   LMP 07/07/2024 (Exact Date)   BMI 48.40 kg/m²   General:  alert and oriented, in no acute distress        Future Appointments  Date Time Provider Department Center  6/17/2025  3:40 PM DO CAMILLE Brothers  [facility]  6/18/2025  1:00 PM Hi Carter PA-C [facility]

## 2025-05-01 ENCOUNTER — OFFICE VISIT (OUTPATIENT)
Dept: OBGYN CLINIC | Facility: CLINIC | Age: 30
End: 2025-05-01

## 2025-05-01 VITALS — BODY MASS INDEX: 48.4 KG/M2 | WEIGHT: 293 LBS | SYSTOLIC BLOOD PRESSURE: 118 MMHG | DIASTOLIC BLOOD PRESSURE: 80 MMHG

## 2025-05-01 DIAGNOSIS — Z98.890 S/P DILATATION AND CURETTAGE: ICD-10-CM

## 2025-05-01 DIAGNOSIS — N84.0 ENDOMETRIAL POLYP: Primary | ICD-10-CM

## 2025-05-01 PROCEDURE — 99024 POSTOP FOLLOW-UP VISIT: CPT | Performed by: OBSTETRICS & GYNECOLOGY

## 2025-06-18 ENCOUNTER — OFFICE VISIT (OUTPATIENT)
Dept: BARIATRICS | Facility: CLINIC | Age: 30
End: 2025-06-18
Payer: COMMERCIAL

## 2025-06-18 VITALS
TEMPERATURE: 98.1 F | RESPIRATION RATE: 17 BRPM | WEIGHT: 293 LBS | DIASTOLIC BLOOD PRESSURE: 96 MMHG | HEART RATE: 102 BPM | SYSTOLIC BLOOD PRESSURE: 138 MMHG | BODY MASS INDEX: 45.99 KG/M2 | HEIGHT: 67 IN

## 2025-06-18 DIAGNOSIS — E28.2 PCOS (POLYCYSTIC OVARIAN SYNDROME): ICD-10-CM

## 2025-06-18 DIAGNOSIS — E66.01 MORBID OBESITY WITH BMI OF 45.0-49.9, ADULT (HCC): Primary | ICD-10-CM

## 2025-06-18 PROCEDURE — 99214 OFFICE O/P EST MOD 30 MIN: CPT | Performed by: PHYSICIAN ASSISTANT

## 2025-06-18 RX ORDER — TIRZEPATIDE 2.5 MG/.5ML
2.5 INJECTION, SOLUTION SUBCUTANEOUS WEEKLY
Qty: 2 ML | Refills: 0 | Status: SHIPPED | OUTPATIENT
Start: 2025-06-18 | End: 2025-07-16

## 2025-06-18 NOTE — PROGRESS NOTES
Assessment/Plan:    Morbid obesity with BMI of 45.0-49.9, adult ([facility])  -Patient is pursuing Conservative Program  -Initial weight loss goal of 5-10% weight loss for improved health  -Screening labs reviewed from prior and last WM consult    Initial:292 (2024)  Current:303.8 (today)    To start on zepbound. To start on initial dose of medication and then to titrate as tolerated.  They have tried more than 6 months of lifestyle modifications including diet and activity changes and has had insignificant weight loss of less than 1 lb a week. Patient denies personal and family history of MCT and MEN2 tumors. Patient denies personal history of pancreatitis. Side effects discussed but not limited to diarrhea, bloating, constipation, GI upset, heartburn, increased heart rate, headache, low blood sugar, fatigue and dizziness. Titration and medication administration discussed.  Discussed using barrier contraception while on the medication    Recommend to continue current gym plan  To schedule RD visit but discussed food logging  Continue at least 64 oz water daily      PCOS (polycystic ovarian syndrome)  On metformin and follows with gyn           Diagnoses and all orders for this visit:    Morbid obesity with BMI of 45.0-49.9, adult ([facility])  -     tirzepatide (Zepbound) 2.5 mg/0.5 mL auto-injector; Inject 0.5 mL (2.5 mg total) under the skin once a week for 28 days    BMI 45.0-49.9, adult ([facility])  -     Ambulatory Referral to Weight Management    PCOS (polycystic ovarian syndrome)          Subjective:   Chief Complaint   Patient presents with    Consult     MWM Consult; Waist 47in; Stop Bang 1/8        Patient ID: Laureen Mathis  is a 30 y.o. female with excess weight/obesity here to pursue weight managment.  Patient is pursuing Conservative Program.     HPI  History of Present Illness  The patient presents for weight management.    She has been grappling with fertility issues, which have led to a period of indecision regarding  her treatment plan with weight loss medications . She has now resolved to focus on weight loss before resuming her fertility treatments. She initiated a course of Wegovy but discontinued it after a month.She was not having any side effects.   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 She has been exercising 3 x week.    MEDICATIONS  Discontinued: Wegovy       Wt Readings from Last 10 Encounters:   06/18/25 (!) 138 kg (303 lb 12.8 oz)   05/01/25 (!) 138 kg (304 lb 6.4 oz)   04/15/25 (!) 136 kg (300 lb 11.3 oz)   04/03/25 (!) 136 kg (300 lb 9.6 oz)   01/15/25 (!) 137 kg (302 lb)   06/25/24 132 kg (292 lb)   05/06/24 133 kg (293 lb 3.2 oz)   05/05/24 136 kg (300 lb)   10/26/23 131 kg (289 lb)   10/13/23 129 kg (285 lb)       Food logging:  Increased appetite/cravings:  Exercise:3 x week walking, low impact cardio/weights  Hydration:at least 64 oz water  Sleep: 6-8 hours    Diet Recall:  B: sometimes eggs or meat/vegetable  D: protein, vegetable starch  S: fruit, protein bar     The following portions of the patient's history were reviewed and updated as appropriate: She  has a past medical history of Abnormal Pap smear of cervix, Asthma, HPV (human papilloma virus) anogenital infection (2/14/2020), and Polycystic ovary syndrome.  She   Patient Active Problem List    Diagnosis Date Noted    Endometrial polyp 04/15/2025    S/P dilatation and curettage 04/15/2025    Asthma     Situational anxiety 10/26/2023    Morbid obesity with BMI of 45.0-49.9, adult ([facility]) 10/26/2023    Iron deficiency anemia due to chronic blood loss 10/26/2023    Hypertension 05/27/2021    Intrinsic eczema 02/01/2019    Kyphosis of thoracic region 08/01/2018    PCOS (polycystic ovarian syndrome) 03/09/2018    Moderate persistent asthma without complication 03/09/2018    Right rotator cuff tendinitis 03/09/2018     She  has a past surgical history that includes [place] tooth extraction and pr hysteroscopy bx  endometrium&/polypc w/wo d&c (N/A, 4/15/2025).  Her family history includes Bipolar disorder in her mother; Diabetes in her mother; Hypertension in her father; Mental illness in her mother; Thyroid disease in her father.  She  reports that she has never smoked. She has never used smokeless tobacco. She reports current alcohol use. She reports that she does not use drugs.  Current Outpatient Medications   Medication Sig Dispense Refill    albuterol (ACCUNEB) 1.25 MG/3ML nebulizer solution Take 3 mL (1.25 mg total) by nebulization every 4 (four) hours as needed for wheezing 360 mL 0    budesonide (PULMICORT) 0.5 mg/2 mL nebulizer solution Take 4 mL (1 mg total) by nebulization 2 (two) times a day for 7 days Rinse mouth after use. 720 mL 1    budesonide-formoterol (SYMBICORT) 160-4.5 mcg/act inhaler Inhale 2 puffs 2 (two) times a day Rinse mouth after use. 10.2 g 3    cetirizine (ZyrTEC) 10 MG chewable tablet Chew 10 mg in the morning.      fluticasone (FLONASE) 50 mcg/act nasal spray SPRAY 1 SPRAY INTO EACH NOSTRIL EVERY DAY 16 mL 1    ibuprofen (ADVIL,MOTRIN) 100 MG tablet Take 6 tablets (600 mg total) by mouth every 6 (six) hours as needed for mild pain      tirzepatide (Zepbound) 2.5 mg/0.5 mL auto-injector Inject 0.5 mL (2.5 mg total) under the skin once a week for 28 days 2 mL 0    metFORMIN (GLUCOPHAGE-XR) 500 mg 24 hr tablet Take 2 tablets (1,000 mg total) by mouth daily with breakfast (Patient not taking: Reported on 6/18/2025) 180 tablet 0     No current facility-administered medications for this visit.     Current Outpatient Medications on File Prior to Visit   Medication Sig    albuterol (ACCUNEB) 1.25 MG/3ML nebulizer solution Take 3 mL (1.25 mg total) by nebulization every 4 (four) hours as needed for wheezing    budesonide (PULMICORT) 0.5 mg/2 mL nebulizer solution Take 4 mL (1 mg total) by nebulization 2 (two) times a day for 7 days Rinse mouth after use.    budesonide-formoterol (SYMBICORT) 160-4.5  "mcg/act inhaler Inhale 2 puffs 2 (two) times a day Rinse mouth after use.    cetirizine (ZyrTEC) 10 MG chewable tablet Chew 10 mg in the morning.    fluticasone (FLONASE) 50 mcg/act nasal spray SPRAY 1 SPRAY INTO EACH NOSTRIL EVERY DAY    ibuprofen (ADVIL,MOTRIN) 100 MG tablet Take 6 tablets (600 mg total) by mouth every 6 (six) hours as needed for mild pain    metFORMIN (GLUCOPHAGE-XR) 500 mg 24 hr tablet Take 2 tablets (1,000 mg total) by mouth daily with breakfast (Patient not taking: Reported on 6/18/2025)     No current facility-administered medications on file prior to visit.     She is allergic to dust mite extract, other, and shellfish allergy - food allergy..    Review of Systems   Constitutional:  Negative for fever.   Respiratory:  Negative for shortness of breath.    Cardiovascular:  Negative for chest pain and palpitations.   Gastrointestinal:  Negative for abdominal pain, constipation, diarrhea and vomiting.   Genitourinary:  Negative for difficulty urinating.   Skin:  Negative for rash.   Neurological:  Negative for headaches.   Psychiatric/Behavioral:  Negative for dysphoric mood.        Objective:    /96 (BP Location: Left arm)   Pulse 102   Temp 98.1 °F (36.7 °C) (Tympanic)   Resp 17   Ht 5' 6.5\" (1.689 m)   Wt (!) 138 kg (303 lb 12.8 oz)   LMP 02/05/2025 Comment: PCOS  BMI 48.30 kg/m²      Physical Exam  Vitals and nursing note reviewed.   Constitutional:       General: She is not in acute distress.     Appearance: She is well-developed. She is obese.   HENT:      Head: Normocephalic and atraumatic.     Eyes:      Conjunctiva/sclera: Conjunctivae normal.     Neck:      Thyroid: No thyromegaly.   Pulmonary:      Effort: Pulmonary effort is normal. No respiratory distress.     Skin:     Findings: No rash (visible).     Neurological:      Mental Status: She is alert and oriented to person, place, and time.     Psychiatric:         Behavior: Behavior normal.         "

## 2025-06-18 NOTE — ASSESSMENT & PLAN NOTE
-Patient is pursuing Conservative Program  -Initial weight loss goal of 5-10% weight loss for improved health  -Screening labs reviewed from prior and last WM consult    Initial:292 (2024)  Current:303.8 (today)    To start on zepbound. To start on initial dose of medication and then to titrate as tolerated.  They have tried more than 6 months of lifestyle modifications including diet and activity changes and has had insignificant weight loss of less than 1 lb a week. Patient denies personal and family history of MCT and MEN2 tumors. Patient denies personal history of pancreatitis. Side effects discussed but not limited to diarrhea, bloating, constipation, GI upset, heartburn, increased heart rate, headache, low blood sugar, fatigue and dizziness. Titration and medication administration discussed.  Discussed using barrier contraception while on the medication    Recommend to continue current gym plan  To schedule RD visit but discussed food logging  Continue at least 64 oz water daily

## 2025-06-20 ENCOUNTER — TELEPHONE (OUTPATIENT)
Dept: BARIATRICS | Facility: CLINIC | Age: 30
End: 2025-06-20

## 2025-06-20 NOTE — TELEPHONE ENCOUNTER
PA for Zepbound 2.5mg SUBMITTED to [employer]: turn off the lights    via    [x]CMM-KEY: [id number]   []Surescripts-Case ID #   []Availity-Auth ID # NDC #   []Faxed to plan   []Other website   []Phone call Case ID #     []PA sent as URGENT    All office notes, labs and other pertaining documents and studies sent. Clinical questions answered. Awaiting determination from insurance company.     Turnaround time for your insurance to make a decision on your Prior Authorization can take 7-21 business days.

## 2025-06-24 NOTE — TELEPHONE ENCOUNTER
PA for Zepbound APPROVED     Date(s) approved 6/20/2025 - 6/20/2026    Case #    Patient advised by          [x]360SHOPhart Message  []Phone call   []LMOM  []L/M to call office as no active Communication consent on file  []Unable to leave detailed message as VM not approved on Communication consent       Pharmacy advised by    [x]Fax  []Phone call  []Secure Chat    Specialty Pharmacy    []     Approval letter scanned into Media Yes

## 2025-06-30 ENCOUNTER — TELEPHONE (OUTPATIENT)
Dept: BARIATRICS | Facility: CLINIC | Age: 30
End: 2025-06-30

## 2025-07-10 DIAGNOSIS — E66.01 MORBID OBESITY WITH BMI OF 45.0-49.9, ADULT (HCC): ICD-10-CM

## 2025-07-11 DIAGNOSIS — E66.01 MORBID OBESITY WITH BMI OF 45.0-49.9, ADULT (HCC): Primary | ICD-10-CM

## 2025-07-11 RX ORDER — TIRZEPATIDE 5 MG/.5ML
5 INJECTION, SOLUTION SUBCUTANEOUS WEEKLY
Qty: 2 ML | Refills: 2 | Status: SHIPPED | OUTPATIENT
Start: 2025-07-11

## 2025-07-11 RX ORDER — TIRZEPATIDE 2.5 MG/.5ML
2.5 INJECTION, SOLUTION SUBCUTANEOUS WEEKLY
Qty: 2 ML | Refills: 0 | OUTPATIENT
Start: 2025-07-11 | End: 2025-08-08

## (undated) DEVICE — EXIDINE 4 PCT

## (undated) DEVICE — SCD SEQUENTIAL COMPRESSION COMFORT SLEEVE MEDIUM KNEE LENGTH: Brand: KENDALL SCD

## (undated) DEVICE — TISSUE REMOVAL SYSTEM FLUID MANAGEMENT ACCESSORIES: Brand: SYMPHION

## (undated) DEVICE — 3000CC GUARDIAN II: Brand: GUARDIAN

## (undated) DEVICE — GLOVE PI ULTRA TOUCH SZ.6.5

## (undated) DEVICE — DRAPE EQUIPMENT RF WAND

## (undated) DEVICE — PVC URETHRAL CATHETER: Brand: DOVER

## (undated) DEVICE — PREMIUM DRY TRAY LF: Brand: MEDLINE INDUSTRIES, INC.

## (undated) DEVICE — TISSUE REMOVAL SYSTEM RESECTING DEVICE: Brand: SYMPHION

## (undated) DEVICE — GLOVE INDICATOR PI UNDERGLOVE SZ 6.5 BLUE